# Patient Record
Sex: MALE | Race: WHITE | NOT HISPANIC OR LATINO | Employment: OTHER | URBAN - METROPOLITAN AREA
[De-identification: names, ages, dates, MRNs, and addresses within clinical notes are randomized per-mention and may not be internally consistent; named-entity substitution may affect disease eponyms.]

---

## 2017-03-27 ENCOUNTER — HOSPITAL ENCOUNTER (EMERGENCY)
Facility: CLINIC | Age: 57
Discharge: HOME | End: 2017-03-27
Attending: PHYSICIAN ASSISTANT | Admitting: PHYSICIAN ASSISTANT
Payer: COMMERCIAL

## 2017-03-27 ENCOUNTER — APPOINTMENT (OUTPATIENT)
Dept: GENERAL RADIOLOGY | Facility: CLINIC | Age: 57
End: 2017-03-27
Attending: PHYSICIAN ASSISTANT
Payer: COMMERCIAL

## 2017-03-27 VITALS
TEMPERATURE: 98.4 F | OXYGEN SATURATION: 98 % | SYSTOLIC BLOOD PRESSURE: 137 MMHG | RESPIRATION RATE: 14 BRPM | DIASTOLIC BLOOD PRESSURE: 91 MMHG

## 2017-03-27 DIAGNOSIS — S61.432A: ICD-10-CM

## 2017-03-27 DIAGNOSIS — S62.305B: ICD-10-CM

## 2017-03-27 DIAGNOSIS — L03.116 CELLULITIS OF LEFT LOWER EXTREMITY: ICD-10-CM

## 2017-03-27 PROCEDURE — 29125 APPL SHORT ARM SPLINT STATIC: CPT

## 2017-03-27 PROCEDURE — 29125 APPL SHORT ARM SPLINT STATIC: CPT | Performed by: PHYSICIAN ASSISTANT

## 2017-03-27 PROCEDURE — 99214 OFFICE O/P EST MOD 30 MIN: CPT | Mod: 25 | Performed by: PHYSICIAN ASSISTANT

## 2017-03-27 PROCEDURE — 25000125 ZZHC RX 250: Performed by: PHYSICIAN ASSISTANT

## 2017-03-27 PROCEDURE — 90471 IMMUNIZATION ADMIN: CPT

## 2017-03-27 PROCEDURE — 90715 TDAP VACCINE 7 YRS/> IM: CPT | Performed by: PHYSICIAN ASSISTANT

## 2017-03-27 PROCEDURE — 73130 X-RAY EXAM OF HAND: CPT | Mod: LT

## 2017-03-27 PROCEDURE — 99214 OFFICE O/P EST MOD 30 MIN: CPT | Mod: 25

## 2017-03-27 RX ORDER — CEPHALEXIN 500 MG/1
500 CAPSULE ORAL 4 TIMES DAILY
Qty: 40 CAPSULE | Refills: 0 | Status: SHIPPED | OUTPATIENT
Start: 2017-03-27 | End: 2017-04-06

## 2017-03-27 RX ADMIN — CLOSTRIDIUM TETANI TOXOID ANTIGEN (FORMALDEHYDE INACTIVATED), CORYNEBACTERIUM DIPHTHERIAE TOXOID ANTIGEN (FORMALDEHYDE INACTIVATED), BORDETELLA PERTUSSIS TOXOID ANTIGEN (GLUTARALDEHYDE INACTIVATED), BORDETELLA PERTUSSIS FILAMENTOUS HEMAGGLUTININ ANTIGEN (FORMALDEHYDE INACTIVATED), BORDETELLA PERTUSSIS PERTACTIN ANTIGEN, AND BORDETELLA PERTUSSIS FIMBRIAE 2/3 ANTIGEN 0.5 ML: 5; 2; 2.5; 5; 3; 5 INJECTION, SUSPENSION INTRAMUSCULAR at 14:20

## 2017-03-27 NOTE — ED PROVIDER NOTES
History     Chief Complaint   Patient presents with     Hand Injury     last night with a hammer     HPI  Randall J Jennissen is a 56 year old male who Randall J Jennissen is a 56 year old male who sustained a left hand injury 1 days ago.   Mechanism of injury: patient was hitting a metal tool and the hammer slipped and the tool punctured the left hand just proximal to the 4th knuckle.    Immediate symptoms: immediate pain, delayed swelling, was able to use arm directly after injury, no deformity was noted by the patient, positive swelling, redness, and increased pain to hand with decreased range of motion in hand due to swelling.    Symptoms have been gradual, worsening since that time.   Prior history of related problems: no prior problems with this area in the past.    last tetanus booster 5.5 years ago (booster given in office today)     Patient denies fevers, chills, red streaking up arm, numbness/tingling, wrist pain, or purulent discharge.     Problem list, Medication list, Allergies, and Medical/Social/Surgical histories reviewed in EPIC and updated as appropriate.    Review of Systems     ROS:    General: negative  Skin: positive for left hand redness, swelling, puncture wound.   Resp: No shortness of breath, dyspnea on exertion, cough, or hemoptysis  CV: negative  Musculoskeletal: negative  Neurologic: negative      Physical Exam   BP: (!) 137/91  Heart Rate: 63  Temp: 98.4  F (36.9  C)  Resp: 14  SpO2: 98 %  Physical Exam   Constitutional: healthy, alert and no distress   Cardiovascular: negative, PMI normal. No lifts, heaves, or thrills. RRR. No murmurs, clicks gallops or rub  Respiratory: Lungs clear to auscultation bilaterally. No rales, rhonchi, wheezing appreciated. No accessory muscle use or retractions.   SKIN: left hand erythema to dorsum of hand with closed puncture wound to the distal aspect of the 4th metacarpal. No warmth or drainage noted, or red streaking noted up arm.   JOINT/EXTREMITIES:  extremities normal- no gross deformities noted, gait normal, normal muscle tone, mild edema to the left hand, peripheral pulses normal, decreased range of motion in left and with making a fist due to swelling, and tender to palpation over 4th metatarsal of left hand. Wrist and fingers of left hand normal.     No results found for this or any previous visit (from the past 24 hour(s)).        ED Course     ED Course     Splint application  Date/Time: 3/27/2017 2:46 PM  Performed by: PRINCESS ANDERSEN  Authorized by: PRINCESS ANDERSEN   Consent: Verbal consent obtained.  Risks and benefits: risks, benefits and alternatives were discussed  Consent given by: patient  Patient understanding: patient states understanding of the procedure being performed  Patient identity confirmed: verbally with patient  Location details: left hand  Splint type: volar short arm  Supplies used: cotton padding,  elastic bandage and Ortho-Glass  Post-procedure: The splinted body part was neurovascularly unchanged following the procedure.  Patient tolerance: Patient tolerated the procedure well with no immediate complications                  Critical Care time:  none               Labs Ordered and Resulted from Time of ED Arrival Up to the Time of Departure from the ED - No data to display    Assessments & Plan (with Medical Decision Making)     I have reviewed the nursing notes.    I have reviewed the findings, diagnosis, plan and need for follow up with the patient.    New Prescriptions    CEPHALEXIN (KEFLEX) 500 MG CAPSULE    Take 1 capsule (500 mg) by mouth 4 times daily for 10 days       Final diagnoses:   Open nondisplaced fracture of fourth metacarpal bone of left hand, unspecified portion of metacarpal, initial encounter   Cellulitis of left lower extremity   Puncture wound, hand, left, initial encounter       3/27/2017   [facility] EMERGENCY DEPARTMENT     Princess Andersen, SUE  03/27/17 1449

## 2017-03-27 NOTE — ED AVS SNAPSHOT
[facility]    [address] [facility] [address] [address]    Phone:  [phone]    Fax:  [phone]                                       Randall J Jennissen   MRN: [id number]    Department:  [facility]   Date of Visit:  3/27/2017           After Visit Summary Signature Page     I have received my discharge instructions, and my questions have been answered. I have discussed any challenges I see with this plan with the nurse or doctor.    ..........................................................................................................................................  Patient/Patient Representative Signature      ..........................................................................................................................................  Patient Representative Print Name and Relationship to Patient    ..................................................               ................................................  Date                                            Time    ..........................................................................................................................................  Reviewed by Signature/Title    ...................................................              ..............................................  Date                                                            Time

## 2017-03-27 NOTE — ED AVS SNAPSHOT
[facility] Emergency Department    [address] [facility] [address] [address]    Phone:  [phone]    Fax:  [phone]                                       Randall J Jennissen   MRN: [id number]    Department:  [facility] Emergency Department   Date of Visit:  3/27/2017           Patient Information     Date Of Birth          1960        Your diagnoses for this visit were:     Open nondisplaced fracture of fourth metacarpal bone of left hand, unspecified portion of metacarpal, initial encounter     Cellulitis of left lower extremity     Puncture wound, hand, left, initial encounter        You were seen by Shell Martinez PA-C.      Follow-up Information     Follow up In 2 days.        Discharge Instructions       Rest and elevate the affected painful area as much as possible.    Apply ice for 15-20 minutes intermittently as needed and especially after any offending activity.   Use splint as directed.     Anti-inflammatories/pain relievers like tylenol/acetaminophen or ibuprofen can be very helpful if not allergic to or contraindicated.     Follow up with Orthopedic specialist for further evaluation and treatment     If you have additional questions about specific rehabilitation over time, Consider Physical Therapy if symptoms not better with symptomatic care.     Patient advised to follow up with PCP for recheck in 2 days and with Ortho.     Tetanus given in office today   Use medications as directed      Culture was not sent today.  Warm heat to area. Elevate area.    Tylenol/Ibuprofen OTC as discussed for pain as long as no contraindications or allergies.     Return to clinic or follow up with PCP for recheck in 2 days if no improvement.  To ER if any worsening symptoms at any time, you note the redness expanding outside the margins, red streaking, or fevers.     Patient voiced understanding of instructions given.           24 Hour Appointment Hotline       To make an appointment at any  [facility] call [phone] ([phone]). If you don't have a family doctor or clinic, we will help you find one. [facility] clinics are conveniently located to serve the needs of you and your family.          ED Discharge Orders     ORTHO  REFERRAL       [facility] is referring you to the Orthopedic  Services at [facility].       The  Representative will assist you in the coordination of your Orthopedic and Musculoskeletal Care as prescribed by your physician.    The  Representative will call you within 1 business day to help schedule your appointment, or you may contact the  Representative at:    All areas ~ [phone]     Type of Referral : Non Surgical       Timeframe requested: 1 - 2 days    Coverage of these services is subject to the terms and limitations of your health insurance plan.  Please call member services at your health plan with any benefit or coverage questions.      If X-rays, CT or MRI's have been performed, please contact the facility where they were done to arrange for , prior to your scheduled appointment.  Please bring this referral request to your appointment and present it to your specialist.                     Review of your medicines      START taking        Dose / Directions Last dose taken    cephALEXin 500 MG capsule   Commonly known as:  KEFLEX   Dose:  500 mg   Quantity:  40 capsule        Take 1 capsule (500 mg) by mouth 4 times daily for 10 days   Refills:  0          Our records show that you are taking the medicines listed below. If these are incorrect, please call your family doctor or clinic.        Dose / Directions Last dose taken    ibuprofen 200 MG tablet   Commonly known as:  ADVIL/MOTRIN   Quantity:  120        600 mg by mouth at bedtime   Refills:  0        zolpidem 5 MG tablet   Commonly known as:  AMBIEN   Quantity:  1 tablet        Take tablet by mouth 15 minutes  "prior to sleep, for Sleep Study   Refills:  0                Prescriptions were sent or printed at these locations (1 Prescription)                   [facility] [place], [place] - [address]   [address] [address]    Telephone:  [phone]   Fax:  [phone]   Hours:                  E-Prescribed (1 of 1)         cephALEXin (KEFLEX) 500 MG capsule                Procedures and tests performed during your visit     Hand XR, G/E 3 views, left    Splint application      Orders Needing Specimen Collection     None      Pending Results     No orders found from 3/25/2017 to 3/28/2017.            Pending Culture Results     No orders found from 3/25/2017 to 3/28/2017.             Test Results from your hospital stay     3/27/2017  2:18 PM - Interface, Radiant Ib      Narrative     XR HAND LT G/E 3 VW 3/27/2017 2:13 PM    COMPARISON: None.    HISTORY: Injury with puncture wound over fourth knuckle.        Impression     IMPRESSION: Cortical irregularity at the ulnar aspect of the left  fourth metacarpal head, likely representing fracture. No other  fractures are suspected in the left hand. Joints are preserved and in  normal alignment.    LASHELL MCGINNIS                Thank you for choosing [facility]       Thank you for choosing [facility] for your care. Our goal is always to provide you with excellent care. Hearing back from our patients is one way we can continue to improve our services. Please take a few minutes to complete the written survey that you may receive in the mail after you visit with us. Thank you!        [employer] Information     [employer] lets you send messages to your doctor, view your test results, renew your prescriptions, schedule appointments and more. To sign up, go to [email or web address] . Click on \"Log in\" on the left side of the screen, which will take you to the Welcome page. Then click on \"Sign up Now\" on the right side of the page.     You will be asked to " enter the access code listed below, as well as some personal information. Please follow the directions to create your username and password.     Your access code is: [email or web address]  Expires: 2017  2:50 PM     Your access code will  in 90 days. If you need help or a new code, please call your [facility] clinic or [phone].        Care EveryWhere ID     This is your Care EveryWhere ID. This could be used by other organizations to access your [facility] medical records  [id number]        After Visit Summary       This is your record. Keep this with you and show to your community pharmacist(s) and doctor(s) at your next visit.

## 2017-03-27 NOTE — DISCHARGE INSTRUCTIONS
Rest and elevate the affected painful area as much as possible.    Apply ice for 15-20 minutes intermittently as needed and especially after any offending activity.   Use splint as directed.     Anti-inflammatories/pain relievers like tylenol/acetaminophen or ibuprofen can be very helpful if not allergic to or contraindicated.     Follow up with Orthopedic specialist for further evaluation and treatment     If you have additional questions about specific rehabilitation over time, Consider Physical Therapy if symptoms not better with symptomatic care.     Patient advised to follow up with PCP for recheck in 2 days and with Ortho.     Tetanus given in office today   Use medications as directed      Culture was not sent today.  Warm heat to area. Elevate area.    Tylenol/Ibuprofen OTC as discussed for pain as long as no contraindications or allergies.     Return to clinic or follow up with PCP for recheck in 2 days if no improvement.  To ER if any worsening symptoms at any time, you note the redness expanding outside the margins, red streaking, or fevers.     Patient voiced understanding of instructions given.

## 2019-03-27 ENCOUNTER — OFFICE VISIT (OUTPATIENT)
Dept: FAMILY MEDICINE | Facility: CLINIC | Age: 59
End: 2019-03-27
Payer: COMMERCIAL

## 2019-03-27 VITALS
TEMPERATURE: 97.3 F | DIASTOLIC BLOOD PRESSURE: 78 MMHG | SYSTOLIC BLOOD PRESSURE: 120 MMHG | RESPIRATION RATE: 16 BRPM | WEIGHT: 210 LBS | BODY MASS INDEX: 30.13 KG/M2 | HEART RATE: 72 BPM

## 2019-03-27 DIAGNOSIS — Z11.59 NEED FOR HEPATITIS C SCREENING TEST: ICD-10-CM

## 2019-03-27 DIAGNOSIS — G47.33 OSA (OBSTRUCTIVE SLEEP APNEA): ICD-10-CM

## 2019-03-27 DIAGNOSIS — M19.049 HAND ARTHRITIS: ICD-10-CM

## 2019-03-27 DIAGNOSIS — G89.29 CHRONIC PAIN OF BOTH SHOULDERS: ICD-10-CM

## 2019-03-27 DIAGNOSIS — M25.511 CHRONIC PAIN OF BOTH SHOULDERS: ICD-10-CM

## 2019-03-27 DIAGNOSIS — M25.512 CHRONIC PAIN OF BOTH SHOULDERS: ICD-10-CM

## 2019-03-27 DIAGNOSIS — E78.5 HYPERLIPIDEMIA LDL GOAL <160: Chronic | ICD-10-CM

## 2019-03-27 DIAGNOSIS — R53.83 FATIGUE, UNSPECIFIED TYPE: ICD-10-CM

## 2019-03-27 DIAGNOSIS — Z00.00 PREVENTATIVE HEALTH CARE: Primary | ICD-10-CM

## 2019-03-27 LAB
ANION GAP SERPL CALCULATED.3IONS-SCNC: 6 MMOL/L (ref 3–14)
BUN SERPL-MCNC: 16 MG/DL (ref 7–30)
CALCIUM SERPL-MCNC: 8.8 MG/DL (ref 8.5–10.1)
CHLORIDE SERPL-SCNC: 106 MMOL/L (ref 94–109)
CO2 SERPL-SCNC: 27 MMOL/L (ref 20–32)
CREAT SERPL-MCNC: 1.09 MG/DL (ref 0.66–1.25)
ERYTHROCYTE [DISTWIDTH] IN BLOOD BY AUTOMATED COUNT: 13.1 % (ref 10–15)
GFR SERPL CREATININE-BSD FRML MDRD: 73 ML/MIN/{1.73_M2}
GLUCOSE SERPL-MCNC: 90 MG/DL (ref 70–99)
HCT VFR BLD AUTO: 42.1 % (ref 40–53)
HGB BLD-MCNC: 14.4 G/DL (ref 13.3–17.7)
MCH RBC QN AUTO: 31.5 PG (ref 26.5–33)
MCHC RBC AUTO-ENTMCNC: 34.2 G/DL (ref 31.5–36.5)
MCV RBC AUTO: 92 FL (ref 78–100)
PLATELET # BLD AUTO: 290 10E9/L (ref 150–450)
POTASSIUM SERPL-SCNC: 4.2 MMOL/L (ref 3.4–5.3)
RBC # BLD AUTO: 4.57 10E12/L (ref 4.4–5.9)
SODIUM SERPL-SCNC: 139 MMOL/L (ref 133–144)
TSH SERPL DL<=0.005 MIU/L-ACNC: 2.73 MU/L (ref 0.4–4)
WBC # BLD AUTO: 5.6 10E9/L (ref 4–11)

## 2019-03-27 PROCEDURE — 36415 COLL VENOUS BLD VENIPUNCTURE: CPT | Performed by: FAMILY MEDICINE

## 2019-03-27 PROCEDURE — 84443 ASSAY THYROID STIM HORMONE: CPT | Performed by: FAMILY MEDICINE

## 2019-03-27 PROCEDURE — 85027 COMPLETE CBC AUTOMATED: CPT | Performed by: FAMILY MEDICINE

## 2019-03-27 PROCEDURE — 80048 BASIC METABOLIC PNL TOTAL CA: CPT | Performed by: FAMILY MEDICINE

## 2019-03-27 PROCEDURE — 99396 PREV VISIT EST AGE 40-64: CPT | Performed by: FAMILY MEDICINE

## 2019-03-27 NOTE — PATIENT INSTRUCTIONS
Preventive Health Recommendations  Male Ages 50 - 64    Yearly exam:             See your health care provider every year in order to  o   Review health changes.   o   Discuss preventive care.    o   Review your medicines if your doctor has prescribed any.     Have a cholesterol test every 5 years, or more frequently if you are at risk for high cholesterol/heart disease.     Have a diabetes test (fasting glucose) every three years. If you are at risk for diabetes, you should have this test more often.     Have a colonoscopy at age 50, or have a yearly FIT test (stool test). These exams will check for colon cancer.      Talk with your health care provider about whether or not a prostate cancer screening test (PSA) is right for you.    You should be tested each year for STDs (sexually transmitted diseases), if you re at risk.     Shots: Get a flu shot each year. Get a tetanus shot every 10 years.     Nutrition:    Eat at least 5 servings of fruits and vegetables daily.     Eat whole-grain bread, whole-wheat pasta and brown rice instead of white grains and rice.     Get adequate Calcium and Vitamin D.     Lifestyle    Exercise for at least 150 minutes a week (30 minutes a day, 5 days a week). This will help you control your weight and prevent disease.     Limit alcohol to one drink per day.     No smoking.     Wear sunscreen to prevent skin cancer.     See your dentist every six months for an exam and cleaning.     See your eye doctor every 1 to 2 years.    ASSESSMENT/PLAN:                                                    Lifestyle recommendations:regular exercise, at least 150 minutes per week (average 30 minutes 5 times a week)  keep working on losing weight (ideal BMI-body mass index is <25)  always wear seat belt  The following exams/tests were recommended and discussed for health maintenance:  Colon cancer screening recommended 2021  Prostate cancer screening is optional starting at age 50 if normal risk, age  40 or 45 for high risk men (strong family history or blacks.)  Screening can include digital rectal exam and PSA blood test.     (Z00.00) Preventative health care  (primary encounter diagnosis)    (M25.511,  G89.29,  M25.512) Chronic pain of both shoulders  Comment:   Plan: Discussed options for treatment of shoulder pain which have some proven effectiveness including, appropriate doses of an anti-inflammatory medication like ibuprofen or naproxen.  Typicaly these are taken on a regular basis for a couple weeks.   Physical therapy is another way to improve shoulder pain and function.    Recheck if he is having persistent pain. Corticosteroid injection may be an option as well if not improving.     (M19.049) Hand arthritis  Comment: most likely osteoarthritis.    Plan: OK for the ibuprofen as needed.    Pain and anti-inflammatory medication options:  Ibuprofen 200mg OTC may be taken up to 4 pills 4 times a day to the maximum of 3200mg or 16 pills daily as needed for pain.     Naproxen (Aleve) OTC may be taken up to 2 pills 2 times a day to the maximum of 4 pills daily as needed for pain.     Aspirin may be taken up to 2-3 pills every 4 hours as needed for pain.     Take only one type of these at a time and take with food as they all can irritate stomach and cause ulcers.      Other pain medication:  Acetaminophen (Tylenol) may be taken as needed for pain and fever.  The maximum doses are listed below, around 3000mg a day.  Regular strength pills are 325mg.  The maximum daily dose is two pills (650mg) every 4 to 6 hours up to 3250mg a day.   Extra strength pills are 500mg each.  The maximum dose is two pills (1000mg) every 6 hours as needed up to 3000mg a day.   Extended release pills are 650mg each.  The maximum dose is two pills (1300mg) every 8 hours as needed up to 3900mg a day.     Watch out for acetaminophen in other over the counter or prescription medications.      Too much acetaminophen can lead to serious  liver damage.  It is OK to take acetaminophen with above anti-inflammatory medications.      (R53.83) Fatigue, unspecified type  Comment: probable from sleep apnea  Plan: TSH, Basic metabolic panel, CBC with platelets        CPAP would be the most beneficial.   Let me know if you would like a sleep clinic referral     (E78.5) Hyperlipidemia LDL goal <160  Comment:   Plan: Non-fasting blood tests today.     (G47.33) JO (obstructive sleep apnea)  Comment:   Plan: see above

## 2019-03-27 NOTE — NURSING NOTE
"Chief Complaint   Patient presents with     Physical       Initial /78 (BP Location: Right arm, Patient Position: Chair, Cuff Size: Adult Regular)   Pulse 72   Temp 97.3  F (36.3  C) (Tympanic)   Resp 16   Wt 95.3 kg (210 lb)   BMI 30.13 kg/m   Estimated body mass index is 30.13 kg/m  as calculated from the following:    Height as of 12/8/16: 1.778 m (5' 10\").    Weight as of this encounter: 95.3 kg (210 lb).    Patient presents to the clinic using No DME    Health Maintenance that is potentially due pending provider review:  NONE    Angeli Jensen MA  4:42 PM 3/27/2019  .        "

## 2019-03-27 NOTE — PROGRESS NOTES
"  SUBJECTIVE:   CC: Randall J Jennissen is an 58 year old male who presents for preventive health visit.   Chief Complaint   Patient presents with     Physical      Self employed.  Has high deductible insurance.     Issues:  Fatigue.  Sleep study a couple years ago.  Diagnosed with sleep apnea.  Tried mouthgard by his dentist which did not help.  Was recommended CPAP but did not try it.  Snores.  No known apnea.   He has daytime sleepiness.     bilateral shoulder pain.  Course of symptoms over time: worse. He has had this checked out in the past.  Unable to do pushups any more.  He had MRI.      Some arthritis in hands.  Came on fast.  No erythema or swelling.  Mostly left hand some right 2,3,4 MCP and PIP.  Seemed sudden onset.  Some AM stiffness but can last all day.     Takes ibuprofen at night for back.  Takes 400-600mg.    \"Genomic spine disorder\"    Healthy Habits:    Do you get at least three servings of calcium containing foods daily (dairy, green leafy vegetables, etc.)? yes and no, taking calcium and/or vitamin D supplement: no    Amount of exercise or daily activities, outside of work: \"very little\" day(s) per week    Problems taking medications regularly not applicable    Medication side effects: No    Have you had an eye exam in the past two years? Yes, within last month    Do you see a dentist twice per year? yes    Do you have sleep apnea, excessive snoring or daytime drowsiness? Unknown, has had a test but results unknown. X 2-3 years ago    Today's PHQ-2 Score:   PHQ-2 ( 1999 [employer]) 3/27/2019 2/2/2016   Q1: Little interest or pleasure in doing things 0 0   Q2: Feeling down, depressed or hopeless 0 0   PHQ-2 Score 0 0   PHQ-2 Score Incomplete -     Abuse: Current or Past(Physical, Sexual or Emotional)- No  Do you feel safe in your environment? Yes    Social History     Tobacco Use     Smoking status: Former Smoker     Last attempt to quit: 1/1/2004     Years since quitting: 15.2     Smokeless " tobacco: Never Used   Substance Use Topics     Alcohol use: Yes     Alcohol/week: 0.0 oz     Comment: 12 pack in a week     If you drink alcohol do you typically have >3 drinks per day or >7 drinks per week? Yes - AUDIT SCORE:     No flowsheet data found.   Alcohol 1-2 drinks a day.  More on weekends.   UP to 10-12 drinks on a weekend.                       Last PSA: No results found for: PSA    Patient Active Problem List    Diagnosis Date Noted     JO (obstructive sleep apnea) 2016     Priority: Medium     Moderate JO without sleep-associated hypoxemia  2016 - AHI 18.4, maikol SpO2 86%, time of SpO2 <= 88% of 0.3 minutes.  TST of ~180 minutes, sleep efficiency ~45%, but supine REM was observed without significant worsening of JO.  Two periods of frequent PLM's observed, but only infrequently associated with cortical arousals.  EKG NSR.       Non morbid obesity due to excess calories 2016     Priority: Medium     Allergic conjunctivitis 2014     Priority: Medium     Hyperlipidemia LDL goal <160 10/31/2010     Priority: Medium        Family history:  CV disease: no  Prostate cancer: no  Colon cancer: no    Multivitamin or Vit D use: no    Vaccines:current tetanus.      Past Colon cancer screenin    ROS:  General: No change in weight, sleep or appetite.  Normal energy.  No fever or chills  Eyes: Negative for vision changes or eye problems  ENT: No problems with ears, nose or throat.  No difficulty swallowing.  Resp: No coughing, wheezing or shortness of breath.  Feels he is more out of shape.   CV: No chest pains or palpitations  GI: No nausea, vomiting,  heartburn, abdominal pain, diarrhea, constipation or change in bowel habits  : No urinary frequency or dysuria, bladder or kidney problems  Musculoskeletal: arthralgias hands and shoulders.  Neurologic: No headaches, numbness, tingling, weakness, problems with balance or coordination  Psychiatric: No problems with anxiety,  depression or mental health  Heme/immune/allergy: No history of bleeding or clotting problems or anemia.  No allergies or immune system problems  Endocrine: No history of thyroid disease, diabetes or other endocrine disorders  Skin: No rashes,worrisome lesions or skin problems    OBJECTIVE:                                                    OBJECTIVE:Blood pressure 120/78, pulse 72, temperature 97.3  F (36.3  C), temperature source Tympanic, resp. rate 16, weight 95.3 kg (210 lb). BMI=Body mass index is 30.13 kg/m .  GENERAL APPEARANCE ADULT: Alert, no acute distress  EYES: PERRL, EOM normal, conjunctiva and lids normal  HENT: Ears and TMs normal, oral mucosa and posterior oropharynx normal  NECK: No adenopathy,masses or thyromegaly  RESP: lungs clear to auscultation   CV: normal rate, regular rhythm, no murmur or gallop  ABDOMEN: soft, no organomegaly, masses or tenderness   (male): declined  MS: bilateral shoulder exam: appearance normal, range of motion: full but slow and painful with abduction over 90 degrees, impingement signs present, strength in abduction decreased.   hand exam Normal hand appearance and range of motion, non-tender, swelling-none, erythema-none  No peripheral edema.     ASSESSMENT/PLAN:                                                    Lifestyle recommendations:regular exercise, at least 150 minutes per week (average 30 minutes 5 times a week)  keep working on losing weight (ideal BMI-body mass index is <25)  always wear seat belt  The following exams/tests were recommended and discussed for health maintenance:  Colon cancer screening recommended 2021  Prostate cancer screening is optional starting at age 50 if normal risk, age 40 or 45 for high risk men (strong family history or blacks.)  Screening can include digital rectal exam and PSA blood test.     (Z00.00) Preventative health care  (primary encounter diagnosis)    (M25.511,  G89.29,  M25.512) Chronic pain of both shoulders  Comment:    Plan: Discussed options for treatment of shoulder pain which have some proven effectiveness including, appropriate doses of an anti-inflammatory medication like ibuprofen or naproxen.  Typicaly these are taken on a regular basis for a couple weeks.   Physical therapy is another way to improve shoulder pain and function.    Recheck if he is having persistent pain. Corticosteroid injection may be an option as well if not improving.     (M19.049) Hand arthritis  Comment: most likely osteoarthritis.    Plan: OK for the ibuprofen as needed.    Pain and anti-inflammatory medication options:  Ibuprofen 200mg OTC may be taken up to 4 pills 4 times a day to the maximum of 3200mg or 16 pills daily as needed for pain.     Naproxen (Aleve) OTC may be taken up to 2 pills 2 times a day to the maximum of 4 pills daily as needed for pain.     Aspirin may be taken up to 2-3 pills every 4 hours as needed for pain.     Take only one type of these at a time and take with food as they all can irritate stomach and cause ulcers.      Other pain medication:  Acetaminophen (Tylenol) may be taken as needed for pain and fever.  The maximum doses are listed below, around 3000mg a day.  Regular strength pills are 325mg.  The maximum daily dose is two pills (650mg) every 4 to 6 hours up to 3250mg a day.   Extra strength pills are 500mg each.  The maximum dose is two pills (1000mg) every 6 hours as needed up to 3000mg a day.   Extended release pills are 650mg each.  The maximum dose is two pills (1300mg) every 8 hours as needed up to 3900mg a day.     Watch out for acetaminophen in other over the counter or prescription medications.      Too much acetaminophen can lead to serious liver damage.  It is OK to take acetaminophen with above anti-inflammatory medications.      (R53.83) Fatigue, unspecified type  Comment: probable from sleep apnea  Plan: TSH, Basic metabolic panel, CBC with platelets        CPAP would be the most beneficial.   Let me  "know if you would like a sleep clinic referral     (E78.5) Hyperlipidemia LDL goal <160  Comment:   Plan: Non-fasting blood tests today.     (G47.33) JO (obstructive sleep apnea)  Comment:   Plan: see above      COUNSELING:  Reviewed preventive health counseling, as reflected in patient instructions  Special attention given to:        Consider Hep C screening for patients born between 1945 and 1965       HIV screeninx in teen years, 1x in adult years, and at intervals if high risk       Colon cancer screening       Prostate cancer screening    BP Readings from Last 1 Encounters:   17 (!) 137/91     Estimated body mass index is 30.13 kg/m  as calculated from the following:    Height as of 16: 1.778 m (5' 10\").    Weight as of 16: 95.3 kg (210 lb).      Weight management plan: Discussed healthy diet and exercise guidelines     reports that he quit smoking about 15 years ago. he has never used smokeless tobacco.      Counseling Resources:  ATP IV Guidelines  Pooled Cohorts Equation Calculator  FRAX Risk Assessment  ICSI Preventive Guidelines  Dietary Guidelines for Americans, 2010  USDA's MyPlate  ASA Prophylaxis  Lung CA Screening    Uriel Sams MD  [facility]  "

## 2019-03-28 NOTE — RESULT ENCOUNTER NOTE
Please call.  TSH is normal indicating that the level of thyroid hormone is in the normal range.   The blood chemistries (Basic metabolic panel) are all normal including electrolytes (salt balances in the blood), blood glucose and kidney tests.   Your blood counts including the white blood cells, red blood cells and platelets are all normal.

## 2019-08-28 ENCOUNTER — NURSE TRIAGE (OUTPATIENT)
Dept: NURSING | Facility: CLINIC | Age: 59
End: 2019-08-28

## 2019-08-28 ENCOUNTER — OFFICE VISIT (OUTPATIENT)
Dept: PODIATRY | Facility: CLINIC | Age: 59
End: 2019-08-28
Payer: COMMERCIAL

## 2019-08-28 VITALS
HEIGHT: 70 IN | SYSTOLIC BLOOD PRESSURE: 148 MMHG | WEIGHT: 208.8 LBS | DIASTOLIC BLOOD PRESSURE: 95 MMHG | HEART RATE: 56 BPM | BODY MASS INDEX: 29.89 KG/M2

## 2019-08-28 DIAGNOSIS — M76.62 TENDONITIS, ACHILLES, LEFT: Primary | ICD-10-CM

## 2019-08-28 PROCEDURE — 99203 OFFICE O/P NEW LOW 30 MIN: CPT | Performed by: PODIATRIST

## 2019-08-28 RX ORDER — METHYLPREDNISOLONE 4 MG
4 TABLET, DOSE PACK ORAL SEE ADMIN INSTRUCTIONS
Qty: 21 TABLET | Refills: 0 | Status: SHIPPED | OUTPATIENT
Start: 2019-08-28 | End: 2020-07-09

## 2019-08-28 RX ORDER — DIPHENOXYLATE HYDROCHLORIDE AND ATROPINE SULFATE 2.5; .025 MG/1; MG/1
1 TABLET ORAL
COMMUNITY
Start: 2017-09-21 | End: 2023-10-03

## 2019-08-28 RX ORDER — PHYTONADIONE 5 MG/1
5 TABLET ORAL ONCE
COMMUNITY
End: 2023-10-03

## 2019-08-28 RX ORDER — MELOXICAM 7.5 MG/1
7.5 TABLET ORAL DAILY
Qty: 30 TABLET | Refills: 1 | Status: SHIPPED | OUTPATIENT
Start: 2019-08-28 | End: 2020-07-09

## 2019-08-28 RX ORDER — CHOLECALCIFEROL (VITAMIN D3) 50 MCG
1 TABLET ORAL DAILY
COMMUNITY
End: 2023-10-03

## 2019-08-28 ASSESSMENT — MIFFLIN-ST. JEOR: SCORE: 1755.42

## 2019-08-28 ASSESSMENT — PAIN SCALES - GENERAL: PAINLEVEL: MILD PAIN (3)

## 2019-08-28 NOTE — LETTER
8/28/2019         RE: Randall J Jennissen  [address] [facility] [id number]        Dear Colleague,    Thank you for referring your patient, Randall J Jennissen, to the [facility]. Please see a copy of my visit note below.    PATIENT HISTORY:  Randall J Jennissen is a 58 year old male who presents to clinic for a painful left foot .  The patient describes the pain as sharp stabbing.  The patient relates the pain level is moderate.  The patient relates pain is located in the back of the left ankle.  The patient relates the pain has been present for the past several weeks.  The patient relates pain with ambulation.  The patient has tried different shoes with little relief.         REVIEW OF SYSTEMS:  Constitutional, HEENT, cardiovascular, pulmonary, GI, , musculoskeletal, neuro, skin, endocrine and psych systems are negative, except as otherwise noted.     PAST MEDICAL HISTORY:   Past Medical History:   Diagnosis Date     Calculus of kidney      Headache(784.0)     likely stress     Keratoconus      Uses contact lenses         PAST SURGICAL HISTORY:   Past Surgical History:   Procedure Laterality Date     COLONOSCOPY       COLONOSCOPY N/A 12/8/2016    Procedure: COLONOSCOPY;  Surgeon: Sean Lopez MD;  Location: [facility]     SURGICAL HISTORY OF -       ORIF left leg     SURGICAL HISTORY OF -       low back nerve block?        MEDICATIONS:   Current Outpatient Medications:      Cyanocobalamin (VITAMIN B 12) 100 MCG REYES, , Disp: , Rfl:      IBUPROFEN 200 MG OR TABS, 600 mg by mouth at bedtime, Disp: 120, Rfl: 0     Multiple Vitamin (MULTI-VITAMINS) TABS, Take 1 tablet by mouth, Disp: , Rfl:      phytonadione (MEPHYTON/VIT K) 5 MG tablet, Take 5 mg by mouth once, Disp: , Rfl:      vitamin D3 (CHOLECALCIFEROL) 2000 units (50 mcg) tablet, Take 1 tablet by mouth daily, Disp: , Rfl:      ALLERGIES:  No Known Allergies     SOCIAL HISTORY:   Social History     Socioeconomic History     Marital  "status:      Spouse name: Not on file     Number of children: Not on file     Years of education: Not on file     Highest education level: Not on file   Occupational History     Not on file   Social Needs     Financial resource strain: Not on file     Food insecurity:     Worry: Not on file     Inability: Not on file     Transportation needs:     Medical: Not on file     Non-medical: Not on file   Tobacco Use     Smoking status: Former Smoker     Last attempt to quit: 1/1/2004     Years since quitting: 15.6     Smokeless tobacco: Never Used   Substance and Sexual Activity     Alcohol use: Yes     Alcohol/week: 0.0 oz     Comment: 12 pack in a week     Drug use: No     Sexual activity: Not on file   Lifestyle     Physical activity:     Days per week: Not on file     Minutes per session: Not on file     Stress: Not on file   Relationships     Social connections:     Talks on phone: Not on file     Gets together: Not on file     Attends Anglican service: Not on file     Active member of club or organization: Not on file     Attends meetings of clubs or organizations: Not on file     Relationship status: Not on file     Intimate partner violence:     Fear of current or ex partner: Not on file     Emotionally abused: Not on file     Physically abused: Not on file     Forced sexual activity: Not on file   Other Topics Concern     Parent/sibling w/ CABG, MI or angioplasty before 65F 55M? No   Social History Narrative     Not on file        FAMILY HISTORY:   Family History   Problem Relation Age of Onset     Cancer - colorectal No family hx of      Prostate Cancer No family hx of      C.A.D. No family hx of         EXAM:Vitals: BP (!) 148/95   Pulse 56   Ht 1.765 m (5' 9.5\")   Wt 94.7 kg (208 lb 12.8 oz)   BMI 30.39 kg/m     BMI= Body mass index is 30.39 kg/m .    Weight management plan: Patient was referred to their PCP to discuss a diet and exercise plan.    General appearance: Patient is alert and fully " cooperative with history & exam.  No sign of distress is noted during the visit.     Psychiatric: Affect is pleasant & appropriate.  Patient appears motivated to improve health.     Respiratory: Breathing is regular & unlabored while sitting.     HEENT: Hearing is intact to spoken word.  Speech is clear.  No gross evidence of visual impairment that would impact ambulation.     Dermatologic: Skin is intact to left lower extremities without significant lesions, rash or abrasion.  No paronychia or evidence of soft tissue infection is noted.     Vascular: DP & PT pulses are intact & regular on the left.  No significant edema or varicosities noted.  CFT and skin temperature is normal to the left lower extremities.     Neurologic: Lower extremity sensation is intact to light touch.  No evidence of weakness or contracture in the left lower extremities.  No evidence of neuropathy.     Musculoskeletal: Patient is ambulatory without assistive device or brace.  No gross ankle deformity noted.  No foot or ankle joint effusion is noted.  Of the pain on palpation of the Achilles tendon on the left.  No surrounding erythema noted.  On notes a tight gastroc complex in the left lower extremity.       ASSESSMENT / PLAN:     ICD-10-CM    1. Tendonitis, Achilles, left M76.62        I have explained to Carmelo  about the conditions.  We discussed the nature of the condition as well as the treatment plan and expected length of recovery.  At this time, the patient was instructed on icing, stretching, tissue massage and support.  The patient was fitted with 1/4 inch heel lift and a Tri-Lock ankle brace that will aid in offloading the tension forces to the soft tissues and prevent further inflammation.  To reduce the amount of current inflammation, the patient was prescribed a Medrol Dosepak followed with Mobic 7.5 mg to be taken daily with food and instructed to stop taking if any stomach irritation or swelling in extremities are noted.   The patient will return in four weeks for reevaluation if the symptoms do not resolve.        Disclaimer: This note consists of symbols derived from keyboarding, dictation and/or voice recognition software. As a result, there may be errors in the script that have gone undetected. Please consider this when interpreting information found in this chart.       MARYAM Espinal D.P.M., F.[email or web address].[email or web address].S.        Again, thank you for allowing me to participate in the care of your patient.        Sincerely,        Jp Espinal DPM

## 2019-08-28 NOTE — PROGRESS NOTES
PATIENT HISTORY:  Randall J Jennissen is a 58 year old male who presents to clinic for a painful left foot .  The patient describes the pain as sharp stabbing.  The patient relates the pain level is moderate.  The patient relates pain is located in the back of the left ankle.  The patient relates the pain has been present for the past several weeks.  The patient relates pain with ambulation.  The patient has tried different shoes with little relief.         REVIEW OF SYSTEMS:  Constitutional, HEENT, cardiovascular, pulmonary, GI, , musculoskeletal, neuro, skin, endocrine and psych systems are negative, except as otherwise noted.     PAST MEDICAL HISTORY:   Past Medical History:   Diagnosis Date     Calculus of kidney      Headache(784.0)     likely stress     Keratoconus      Uses contact lenses         PAST SURGICAL HISTORY:   Past Surgical History:   Procedure Laterality Date     COLONOSCOPY       COLONOSCOPY N/A 12/8/2016    Procedure: COLONOSCOPY;  Surgeon: Sean Lopez MD;  Location: [facility]     SURGICAL HISTORY OF -       ORIF left leg     SURGICAL HISTORY OF -       low back nerve block?        MEDICATIONS:   Current Outpatient Medications:      Cyanocobalamin (VITAMIN B 12) 100 MCG REYES, , Disp: , Rfl:      IBUPROFEN 200 MG OR TABS, 600 mg by mouth at bedtime, Disp: 120, Rfl: 0     Multiple Vitamin (MULTI-VITAMINS) TABS, Take 1 tablet by mouth, Disp: , Rfl:      phytonadione (MEPHYTON/VIT K) 5 MG tablet, Take 5 mg by mouth once, Disp: , Rfl:      vitamin D3 (CHOLECALCIFEROL) 2000 units (50 mcg) tablet, Take 1 tablet by mouth daily, Disp: , Rfl:      ALLERGIES:  No Known Allergies     SOCIAL HISTORY:   Social History     Socioeconomic History     Marital status:      Spouse name: Not on file     Number of children: Not on file     Years of education: Not on file     Highest education level: Not on file   Occupational History     Not on file   Social Needs     Financial resource strain: Not on file  "    Food insecurity:     Worry: Not on file     Inability: Not on file     Transportation needs:     Medical: Not on file     Non-medical: Not on file   Tobacco Use     Smoking status: Former Smoker     Last attempt to quit: 1/1/2004     Years since quitting: 15.6     Smokeless tobacco: Never Used   Substance and Sexual Activity     Alcohol use: Yes     Alcohol/week: 0.0 oz     Comment: 12 pack in a week     Drug use: No     Sexual activity: Not on file   Lifestyle     Physical activity:     Days per week: Not on file     Minutes per session: Not on file     Stress: Not on file   Relationships     Social connections:     Talks on phone: Not on file     Gets together: Not on file     Attends Judaism service: Not on file     Active member of club or organization: Not on file     Attends meetings of clubs or organizations: Not on file     Relationship status: Not on file     Intimate partner violence:     Fear of current or ex partner: Not on file     Emotionally abused: Not on file     Physically abused: Not on file     Forced sexual activity: Not on file   Other Topics Concern     Parent/sibling w/ CABG, MI or angioplasty before 65F 55M? No   Social History Narrative     Not on file        FAMILY HISTORY:   Family History   Problem Relation Age of Onset     Cancer - colorectal No family hx of      Prostate Cancer No family hx of      C.A.D. No family hx of         EXAM:Vitals: BP (!) 148/95   Pulse 56   Ht 1.765 m (5' 9.5\")   Wt 94.7 kg (208 lb 12.8 oz)   BMI 30.39 kg/m    BMI= Body mass index is 30.39 kg/m .    Weight management plan: Patient was referred to their PCP to discuss a diet and exercise plan.    General appearance: Patient is alert and fully cooperative with history & exam.  No sign of distress is noted during the visit.     Psychiatric: Affect is pleasant & appropriate.  Patient appears motivated to improve health.     Respiratory: Breathing is regular & unlabored while sitting.     HEENT: Hearing " is intact to spoken word.  Speech is clear.  No gross evidence of visual impairment that would impact ambulation.     Dermatologic: Skin is intact to left lower extremities without significant lesions, rash or abrasion.  No paronychia or evidence of soft tissue infection is noted.     Vascular: DP & PT pulses are intact & regular on the left.  No significant edema or varicosities noted.  CFT and skin temperature is normal to the left lower extremities.     Neurologic: Lower extremity sensation is intact to light touch.  No evidence of weakness or contracture in the left lower extremities.  No evidence of neuropathy.     Musculoskeletal: Patient is ambulatory without assistive device or brace.  No gross ankle deformity noted.  No foot or ankle joint effusion is noted.  Of the pain on palpation of the Achilles tendon on the left.  No surrounding erythema noted.  On notes a tight gastroc complex in the left lower extremity.       ASSESSMENT / PLAN:     ICD-10-CM    1. Tendonitis, Achilles, left M76.62        I have explained to Carmelo  about the conditions.  We discussed the nature of the condition as well as the treatment plan and expected length of recovery.  At this time, the patient was instructed on icing, stretching, tissue massage and support.  The patient was fitted with 1/4 inch heel lift and a Tri-Lock ankle brace that will aid in offloading the tension forces to the soft tissues and prevent further inflammation.  To reduce the amount of current inflammation, the patient was prescribed a Medrol Dosepak followed with Mobic 7.5 mg to be taken daily with food and instructed to stop taking if any stomach irritation or swelling in extremities are noted.  The patient will return in four weeks for reevaluation if the symptoms do not resolve.        Disclaimer: This note consists of symbols derived from keyboarding, dictation and/or voice recognition software. As a result, there may be errors in the script that have  gone undetected. Please consider this when interpreting information found in this chart.       MARYAM Espinal D.P.M., PAULO.F.KELVINS.

## 2019-08-28 NOTE — TELEPHONE ENCOUNTER
Patient wanted an appointment. I suggested podiatry or internal medicine. His achilles tendon has been bothering his left foot for two months now.  Belkys Kemp RN-[facility]

## 2019-08-28 NOTE — NURSING NOTE
"Chief Complaint   Patient presents with     Consult     Achilies tendon left foot, X2 months       Initial BP (!) 148/95   Pulse 56   Ht 1.765 m (5' 9.5\")   Wt 94.7 kg (208 lb 12.8 oz)   BMI 30.39 kg/m   Estimated body mass index is 30.39 kg/m  as calculated from the following:    Height as of this encounter: 1.765 m (5' 9.5\").    Weight as of this encounter: 94.7 kg (208 lb 12.8 oz).  Medications and allergies reviewed.      Terrie MORALES MA    "

## 2019-08-28 NOTE — PATIENT INSTRUCTIONS
Carmelo to follow up with Primary Care provider regarding elevated blood pressure.    Initial musculoskeletal treatment recommendation:    1.  Wear supportive foot wear (stiff soles) and/or arch supports (rigid not cushion).  2.  Stretch the calf muscles as instructed once an hour.  3.  Massage the soft tissues around the injured area in the morning to loosen the tissue with an over the counter product like Icy Hot with Lidocaine  4.  Ice the injured area in the evening; 20 min on/off.  5. Take antiinflammatory medication as indicated.    If no improvement in symptoms within four to six weeks, return to clinic for reevaluation.

## 2019-11-18 ENCOUNTER — OFFICE VISIT (OUTPATIENT)
Dept: PODIATRY | Facility: CLINIC | Age: 59
End: 2019-11-18
Payer: COMMERCIAL

## 2019-11-18 VITALS
SYSTOLIC BLOOD PRESSURE: 131 MMHG | BODY MASS INDEX: 29.78 KG/M2 | HEART RATE: 67 BPM | HEIGHT: 70 IN | WEIGHT: 208 LBS | DIASTOLIC BLOOD PRESSURE: 85 MMHG

## 2019-11-18 DIAGNOSIS — M76.62 TENDONITIS, ACHILLES, LEFT: Primary | ICD-10-CM

## 2019-11-18 DIAGNOSIS — M72.2 PLANTAR FASCIITIS, LEFT: ICD-10-CM

## 2019-11-18 PROCEDURE — 99213 OFFICE O/P EST LOW 20 MIN: CPT | Performed by: PODIATRIST

## 2019-11-18 RX ORDER — METHYLPREDNISOLONE 4 MG
4 TABLET, DOSE PACK ORAL SEE ADMIN INSTRUCTIONS
Qty: 21 TABLET | Refills: 0 | Status: SHIPPED | OUTPATIENT
Start: 2019-11-18 | End: 2020-07-09

## 2019-11-18 ASSESSMENT — PAIN SCALES - GENERAL: PAINLEVEL: MILD PAIN (3)

## 2019-11-18 ASSESSMENT — MIFFLIN-ST. JEOR: SCORE: 1751.79

## 2019-11-18 NOTE — PROGRESS NOTES
Carmelo returns to the office for reevaluation of the left foot.  The patient relates following the instructions given at the last visit with noted more pain.  The patient relates overall less  improvement in pain and function of the left foot.  The patient relates no other problems.    PAST MEDICAL HISTORY:   Past Medical History:   Diagnosis Date     Calculus of kidney      Headache(784.0)     likely stress     Keratoconus      Uses contact lenses        BMI= Body mass index is 30.28 kg/m .    Weight management plan: Patient was referred to their PCP to discuss a diet and exercise plan.    Physical Exam:    General: The patient appears to have a pleasant mental affect.    Lower extremity physical exam:  Neurovascular status is intact with palpable pedal pulses and intact epicritic sensations.  Muscular exam is within normal limits to major muscle groups.  Integument is intact.      One notes decreased edema.  One notes pain on palpation of the distal insertion point of the Achilles tendon on the left.  Noted pain on palpation over the lateral aspect of the left heel at the insertion point of the plantar fascia.  No surrounding erythema noted.  One notes a tight gastroc complex in the left lower extremity.         Assessment:      ICD-10-CM    1. Tendonitis, Achilles, left M76.62 order for DME     methylPREDNISolone (MEDROL DOSEPAK) 4 MG tablet therapy pack     PHYSICAL THERAPY REFERRAL   2. Plantar fasciitis, left M72.2 order for DME     methylPREDNISolone (MEDROL DOSEPAK) 4 MG tablet therapy pack     PHYSICAL THERAPY REFERRAL       Plan:  I have explained to Carmelo about the conditions.  At this time, the patient was instructed on icing, stretching, tissue massage and support.  The patient was fitted with a cam boot that will aid in offloading the tension forces to the soft tissues and prevent further inflammation.  To reduce the amount of current inflammation, the patient was prescribed a Medrol Dosepak to be  taken daily with food and instructed to stop taking if any stomach irritation or swelling in extremities are noted.  The patient was referred to [place] [facility] therapy for [place] fascial release of the Achilles tendon and plantar fascia.  The patient will return in four weeks for reevaluation if the symptoms do not resolve.      Disclaimer: This note consists of symbols derived from keyboarding, dictation and/or voice recognition software. As a result, there may be errors in the script that have gone undetected. Please consider this when interpreting information found in this chart.       MARYAM Espinal D.P.M., FJONO.F.A.S.

## 2019-11-18 NOTE — NURSING NOTE
"Chief Complaint   Patient presents with     RECHECK     Achilies tendoniits on left foot, \"worse since last visit\"       Initial /85   Pulse 67   Ht 1.765 m (5' 9.5\")   Wt 94.3 kg (208 lb)   BMI 30.28 kg/m   Estimated body mass index is 30.28 kg/m  as calculated from the following:    Height as of this encounter: 1.765 m (5' 9.5\").    Weight as of this encounter: 94.3 kg (208 lb).  Medications and allergies reviewed.      Terrie MORALES MA    "

## 2019-11-18 NOTE — LETTER
11/18/2019         RE: Randall J Jennissen  [address] [facility] [id number]        Dear Colleague,    Thank you for referring your patient, Randall J Jennissen, to the [facility]. Please see a copy of my visit note below.    Carmelo returns to the office for reevaluation of the left foot.  The patient relates following the instructions given at the last visit with noted more pain.  The patient relates overall less  improvement in pain and function of the left foot.  The patient relates no other problems.    PAST MEDICAL HISTORY:   Past Medical History:   Diagnosis Date     Calculus of kidney      Headache(784.0)     likely stress     Keratoconus      Uses contact lenses        BMI= Body mass index is 30.28 kg/m .    Weight management plan: Patient was referred to their PCP to discuss a diet and exercise plan.    Physical Exam:    General: The patient appears to have a pleasant mental affect.    Lower extremity physical exam:  Neurovascular status is intact with palpable pedal pulses and intact epicritic sensations.  Muscular exam is within normal limits to major muscle groups.  Integument is intact.      One notes decreased edema.  One notes pain on palpation of the distal insertion point of the Achilles tendon on the left.  Noted pain on palpation over the lateral aspect of the left heel at the insertion point of the plantar fascia.  No surrounding erythema noted.  One notes a tight gastroc complex in the left lower extremity.         Assessment:      ICD-10-CM    1. Tendonitis, Achilles, left M76.62 order for DME     methylPREDNISolone (MEDROL DOSEPAK) 4 MG tablet therapy pack     PHYSICAL THERAPY REFERRAL   2. Plantar fasciitis, left M72.2 order for DME     methylPREDNISolone (MEDROL DOSEPAK) 4 MG tablet therapy pack     PHYSICAL THERAPY REFERRAL       Plan:  I have explained to Carmelo about the conditions.  At this time, the patient was instructed on icing,  stretching, tissue massage and support.  The patient was fitted with a cam boot that will aid in offloading the tension forces to the soft tissues and prevent further inflammation.  To reduce the amount of current inflammation, the patient was prescribed a Medrol Dosepak to be taken daily with food and instructed to stop taking if any stomach irritation or swelling in extremities are noted.  The patient was referred to [facility] for [place] fascial release of the Achilles tendon and plantar fascia.  The patient will return in four weeks for reevaluation if the symptoms do not resolve.      Disclaimer: This note consists of symbols derived from keyboarding, dictation and/or voice recognition software. As a result, there may be errors in the script that have gone undetected. Please consider this when interpreting information found in this chart.       ANDREW Vu.P.ALYSE., F.A.C.F.A.S.      Again, thank you for allowing me to participate in the care of your patient.        Sincerely,        Jp Espinal DPM

## 2019-11-20 ENCOUNTER — HOSPITAL ENCOUNTER (OUTPATIENT)
Dept: PHYSICAL THERAPY | Facility: CLINIC | Age: 59
Setting detail: THERAPIES SERIES
End: 2019-11-20
Attending: PODIATRIST
Payer: COMMERCIAL

## 2019-11-20 DIAGNOSIS — M72.2 PLANTAR FASCIITIS, LEFT: ICD-10-CM

## 2019-11-20 DIAGNOSIS — M76.62 TENDONITIS, ACHILLES, LEFT: ICD-10-CM

## 2019-11-20 PROCEDURE — 97140 MANUAL THERAPY 1/> REGIONS: CPT | Mod: GP | Performed by: PHYSICAL THERAPIST

## 2019-11-20 PROCEDURE — 97110 THERAPEUTIC EXERCISES: CPT | Mod: GP | Performed by: PHYSICAL THERAPIST

## 2019-11-20 PROCEDURE — 97161 PT EVAL LOW COMPLEX 20 MIN: CPT | Mod: GP | Performed by: PHYSICAL THERAPIST
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
## 2019-11-27 ENCOUNTER — HOSPITAL ENCOUNTER (OUTPATIENT)
Dept: PHYSICAL THERAPY | Facility: CLINIC | Age: 59
Setting detail: THERAPIES SERIES
End: 2019-11-27
Attending: PODIATRIST
Payer: COMMERCIAL

## 2019-11-27 PROCEDURE — 97140 MANUAL THERAPY 1/> REGIONS: CPT | Mod: GP | Performed by: PHYSICAL THERAPIST

## 2019-11-27 PROCEDURE — 97110 THERAPEUTIC EXERCISES: CPT | Mod: GP | Performed by: PHYSICAL THERAPIST

## 2019-12-03 ENCOUNTER — HOSPITAL ENCOUNTER (OUTPATIENT)
Dept: PHYSICAL THERAPY | Facility: CLINIC | Age: 59
Setting detail: THERAPIES SERIES
End: 2019-12-03
Attending: PODIATRIST
Payer: COMMERCIAL

## 2019-12-03 PROCEDURE — 97110 THERAPEUTIC EXERCISES: CPT | Mod: GP | Performed by: PHYSICAL THERAPIST

## 2019-12-03 PROCEDURE — 97140 MANUAL THERAPY 1/> REGIONS: CPT | Mod: GP | Performed by: PHYSICAL THERAPIST

## 2020-01-02 NOTE — PROGRESS NOTES
Outpatient Physical Therapy Discharge Note     Patient: Randall J Jennissen  : 1960    Beginning/End Dates of Reporting Period:  19 to 2020    Referring Provider: Dr Espinal    Therapy Diagnosis: L achilles tendonitis, plantar fasciitis     Client Self Report: Pt notes that after finishing steroids did move backward some. Pain is very manageable. Pain under foot (PF ) has resolved. Achilles gets tight with sitting. Getting ex in 1x/day. Wearing Trilock brace when up and about as well as heel lift. Walked out to deer stand through a foot of snow and did well.     Objective Measurements:  Objective Measure: SL heel raise  Details: Continued difficulty on L, able to perform with 70% body weight  Objective Measure: dorsiflexion L   Details: 15 degrees  Objective Measure: LEFS  Details: 59(36 initial session)            Goals:  Goal Identifier 1   Goal Description Pt will be able to walk 20 minutes on uneven surfaces, outdoors, in the woods etc for hunting withotu increased pain   Target Date 01/15/20   Date Met  19   Progress:     Goal Identifier 2   Goal Description Pt will demonstrate 5/5 plantarflexion strength to perform single leg heel raise   Target Date 01/15/20   Date Met      Progress: progressing toward     Goal Identifier 3   Goal Description Pt will improve LEFS > 50/80 for improved tolerance to functional activity   Target Date 01/15/20   Date Met  19   Progress:         Progress Toward Goals:   Progress this reporting period: Pt demonstrating improvements in range of motion, strength, met 2/3 goals, progress toward final goal. Pt independent with HEP for continued strength and stretching and is adamant that able to continue independently at home as pleased with progress thus far. Chart was held open 30 days with no recheck needed, will discharge at this time.     Plan:  Discharge from therapy.    Discharge:    Reason for Discharge: Patient has met 2/3 goals.  Patient chooses to  discontinue therapy.    Equipment Issued: na    Discharge Plan: Patient to continue home program.

## 2020-07-08 ENCOUNTER — OFFICE VISIT (OUTPATIENT)
Dept: FAMILY MEDICINE | Facility: CLINIC | Age: 60
End: 2020-07-08
Payer: COMMERCIAL

## 2020-07-08 VITALS
RESPIRATION RATE: 18 BRPM | BODY MASS INDEX: 30.07 KG/M2 | WEIGHT: 206.6 LBS | HEART RATE: 74 BPM | SYSTOLIC BLOOD PRESSURE: 134 MMHG | TEMPERATURE: 97.8 F | DIASTOLIC BLOOD PRESSURE: 80 MMHG | OXYGEN SATURATION: 96 %

## 2020-07-08 DIAGNOSIS — T14.8XXA SUPERFICIAL ABRASION: ICD-10-CM

## 2020-07-08 DIAGNOSIS — M25.531 RIGHT WRIST PAIN: Primary | ICD-10-CM

## 2020-07-08 PROCEDURE — 99213 OFFICE O/P EST LOW 20 MIN: CPT | Performed by: PHYSICIAN ASSISTANT

## 2020-07-08 RX ORDER — CEPHALEXIN 500 MG/1
500 CAPSULE ORAL 3 TIMES DAILY
Qty: 21 CAPSULE | Refills: 0 | Status: SHIPPED | OUTPATIENT
Start: 2020-07-08 | End: 2020-07-15

## 2020-07-08 ASSESSMENT — PAIN SCALES - GENERAL: PAINLEVEL: EXTREME PAIN (9)

## 2020-07-08 NOTE — PROGRESS NOTES
Subjective     Randall J Jennissen is a 59 year old male who presents to clinic today for the following health issues:    HPI   Hand pain       Duration: Since last Thursday     Description (location/character/radiation): Patient states that on Thursday he got a scratch or bug bite( scab at the base of the right thumb) Since then he has numbness in that area and tingling towards the tip of his thumb. Says when grasps something and has to turn his wrist down he gets a sharp pain     Intensity:  moderate, severe    Accompanying signs and symptoms: none     History (similar episodes/previous evaluation): None    Precipitating or alleviating factors: None    Therapies tried and outcome: Ibuprofen in the evening, ice    Denies any fevers, chills, nausea, vomiting, fatigue.   No redness streaking up the arm.   No pain at rest.     Patient Active Problem List   Diagnosis     Hyperlipidemia LDL goal <160     Allergic conjunctivitis     Non morbid obesity due to excess calories     JO (obstructive sleep apnea)     Past Surgical History:   Procedure Laterality Date     COLONOSCOPY       COLONOSCOPY N/A 2016    Procedure: COLONOSCOPY;  Surgeon: Sean Lopez MD;  Location: [facility]     SURGICAL HISTORY OF -       ORIF left leg     SURGICAL HISTORY OF -       low back nerve block?       Social History     Tobacco Use     Smoking status: Former Smoker     Last attempt to quit: 2004     Years since quittin.5     Smokeless tobacco: Never Used   Substance Use Topics     Alcohol use: Yes     Alcohol/week: 0.0 standard drinks     Comment: 12 pack in a week     Family History   Problem Relation Age of Onset     Cancer - colorectal No family hx of      Prostate Cancer No family hx of      C.A.D. No family hx of          Current Outpatient Medications   Medication Sig Dispense Refill     cephALEXin (KEFLEX) 500 MG capsule Take 1 capsule (500 mg) by mouth 3 times daily for 7 days 21 capsule 0     Cyanocobalamin (VITAMIN B  12) 100 MCG LOZG        IBUPROFEN 200 MG OR TABS 600 mg by mouth at bedtime 120 0     vitamin D3 (CHOLECALCIFEROL) 2000 units (50 mcg) tablet Take 1 tablet by mouth daily       Multiple Vitamin (MULTI-VITAMINS) TABS Take 1 tablet by mouth       order for DME Equipment being ordered: quarter inch heel lift (Patient not taking: Reported on 7/8/2020) 1 Units 0     phytonadione (MEPHYTON/VIT K) 5 MG tablet Take 5 mg by mouth once       No Known Allergies    Reviewed and updated as needed this visit by Provider  Tobacco  Allergies  Meds  Problems  Med Hx  Surg Hx  Fam Hx         Review of Systems   Constitutional, msk, skin, neuro, cardiovascular, pulmonary, gi and gu systems are negative, except as otherwise noted.      Objective    /80 (BP Location: Right arm, Patient Position: Sitting, Cuff Size: Adult Large)   Pulse 74   Temp 97.8  F (36.6  C) (Tympanic)   Resp 18   Wt 93.7 kg (206 lb 9.6 oz)   SpO2 96%   BMI 30.07 kg/m    Body mass index is 30.07 kg/m .  Physical Exam   Constitutional: healthy, alert, and no distress  Head: Normocephalic. Atraumatic  Eyes: No conjunctival injection, sclera anicteric  Respiratory: No resp distress.  Musculoskeletal: There is mild swelling and mild tenderness along the radial wrist right. FROM of the wrist. Finkelstein test positive on the R.   Neurologic: Gait normal. CN 2-12 grossly intact  Psychiatric: mentation appears normal and affect normal/bright  Skin: There is a scabbed abrasion along the radial aspect of the right wrist with mild surrounding erythema. No streaking.     Diagnostic Test Results:  none         Assessment & Plan   (M25.531) Right wrist pain  (primary encounter diagnosis)  Comment: 5 days of R wrist and thumb pain after he developed a superficial abrasion over the radial aspect of the R wrist. He only has pain with movement of the wrist. Exam with only minimal surrounding erythema with mild tenderness. There is no streaking, FROM of the wrist,  and Finkelsteins test is positive. Given his abrasion and redness, considered cellulitis, tenosynovitis, and septic joint but there is no significant erythema, no streaking, and pain is only with movement of the wrist. More likely I suspect that this is either tendonitis or the inflammation surrounding the abrasion is causing some inflammation of the surrounding structures. Discussed this with the patient and he agreed to treat this with rest and Ibuprofen, and if symptoms not improve or worsening over the next few days, to fill a Rx for Keflex for a mild cellulitis. If he does this, he will call and let us know so that we can follow up accordingly.   Plan: cephALEXin (KEFLEX) 500 MG capsule    (T14.8XXA) Superficial abrasion  Comment: Treat as above.   Plan: cephALEXin (KEFLEX) 500 MG capsule    Return in about 1 week (around 7/15/2020), or if symptoms worsen or fail to improve, for In-Clinic Visit.    Candelario Sanabria PA-C  [facility]

## 2020-07-08 NOTE — PATIENT INSTRUCTIONS
Lets treat you now for tendonitis of one of the tendons in your wrist. Follow the steps below.     If your redness, pain or swelling get worse, the antibiotic Keflex is at the pharmacy for you to . If you do pick this up, let me know over the phone so that we can follow up appropriately.     The majority of swelling comes in the first 48 hours after an injury.  You can reduce the effects of this by applying cold to the injury immediately after an injury and for at least 20 minutes of every hour as able.  Rest and elevation of the injured area (if possible) and anti-inflammatory medications such as ibuprofen or naproxen will also be helpful during this time.    As a general rule, the body heals itself in response to the forces that are applied to it.  In other words, if you just rest, you'll never fully recover. After the initial rest period, gently moving the injured joint (if appropriate) will also help speed ultimate recovery.  If injuries are mild to moderate, you can progress to full range of motion without resistance.  As this becomes easier and more comfortable over the course of days, this area can then begin to be tested carefully with controlled weight-bearing or resistance activities.    If you have additional questions about specific rehabilitation over time, please contact me.    Patient Education     RICE     Rest an injury, elevate it, and use ice and compression as directed.   RICE stands for rest, ice, compression, and elevation. These can limit pain and swelling after an injury. RICE may be recommended to help treat breaks (fractures), sprains, strains, and bruises or bumps.   Home care  Here are the details of RICE:    Rest. Limit the use of the injured body part. This helps prevent further damage to the body part and gives it time to heal. In some cases, you may need a sling, brace, splint, or cast to help keep the body part still until it has healed.    Ice. Applying ice right after an  injury helps relieve pain and swelling. To make an ice pack, put ice cubes in a plastic bag that seals at the top. Wrap the bag in a clean, thin towel or cloth. Then place it over the injured area. Do this for 10 to 15 minutes every 3 to 4 hours. Continue for the next 1 to 3 days or until your symptoms improve. Never put ice directly on your skin. Don't ice an area longer than 15 minutes at a time.    Compression. Putting pressure on an injury helps reduce swelling and provides support. Wrap the injured area firmly with an elastic bandage or wrap. Make sure not to wrap the bandage too tightly or you will cut off blood flow to the injured area. If your bandage loosens, rewrap it.    Elevation. Keeping an injury raised or elevated above the level of your heart reduces swelling, pain, and throbbing. For instance, if you have a broken leg, it may help to rest your leg on several pillows when sitting or lying down. Try to keep the injured area elevated as often as possible.  Follow-up care  Follow up with your healthcare provider, or as advised.  When to seek medical advice  Call your healthcare provider right away if any of these occur:    Fever of 100.4 F (38 C) or higher, or as directed by your healthcare provider    Chills    Increased pain or swelling in the injured body part    Injured body part becomes cold, blue, numb, or tingly    Signs of infection. These include warmth in the skin, redness, drainage, or bad smell coming from the injured body part.  Date Last Reviewed: 6/1/2018 2000-2019 [employer]. [address] [place] [address], [place] [address]. All rights reserved. This information is not intended as a substitute for professional medical care. Always follow your healthcare professional's instructions.

## 2021-05-14 ENCOUNTER — IMMUNIZATION (OUTPATIENT)
Dept: LAB | Facility: CLINIC | Age: 61
End: 2021-05-14
Payer: COMMERCIAL

## 2021-07-04 ENCOUNTER — HEALTH MAINTENANCE LETTER (OUTPATIENT)
Age: 61
End: 2021-07-04

## 2021-07-26 ENCOUNTER — RECORDS - HEALTHEAST (OUTPATIENT)
Dept: ADMINISTRATIVE | Facility: CLINIC | Age: 61
End: 2021-07-26

## 2021-10-24 ENCOUNTER — HEALTH MAINTENANCE LETTER (OUTPATIENT)
Age: 61
End: 2021-10-24

## 2021-11-17 ENCOUNTER — HOSPITAL ENCOUNTER (EMERGENCY)
Facility: CLINIC | Age: 61
Discharge: HOME | End: 2021-11-17
Attending: EMERGENCY MEDICINE | Admitting: EMERGENCY MEDICINE
Payer: COMMERCIAL

## 2021-11-17 VITALS
BODY MASS INDEX: 26.48 KG/M2 | SYSTOLIC BLOOD PRESSURE: 167 MMHG | OXYGEN SATURATION: 100 % | WEIGHT: 185 LBS | HEIGHT: 70 IN | HEART RATE: 81 BPM | TEMPERATURE: 98.2 F | RESPIRATION RATE: 16 BRPM | DIASTOLIC BLOOD PRESSURE: 96 MMHG

## 2021-11-17 DIAGNOSIS — T24.212A PARTIAL THICKNESS BURN OF LEFT THIGH, INITIAL ENCOUNTER: ICD-10-CM

## 2021-11-17 DIAGNOSIS — T23.292A PARTIAL THICKNESS BURN OF MULTIPLE SITES OF LEFT HAND, INITIAL ENCOUNTER: ICD-10-CM

## 2021-11-17 PROCEDURE — 16020 DRESS/DEBRID P-THICK BURN S: CPT | Performed by: EMERGENCY MEDICINE

## 2021-11-17 PROCEDURE — 99283 EMERGENCY DEPT VISIT LOW MDM: CPT | Performed by: EMERGENCY MEDICINE

## 2021-11-17 PROCEDURE — 99283 EMERGENCY DEPT VISIT LOW MDM: CPT | Mod: 25 | Performed by: EMERGENCY MEDICINE

## 2021-11-17 PROCEDURE — 250N000009 HC RX 250

## 2021-11-17 PROCEDURE — 250N000009 HC RX 250: Performed by: EMERGENCY MEDICINE

## 2021-11-17 RX ORDER — SILVER SULFADIAZINE 10 MG/G
CREAM TOPICAL ONCE
Status: COMPLETED | OUTPATIENT
Start: 2021-11-17 | End: 2021-11-17

## 2021-11-17 RX ORDER — HYDROCODONE BITARTRATE AND ACETAMINOPHEN 5; 325 MG/1; MG/1
1-2 TABLET ORAL EVERY 6 HOURS PRN
Qty: 15 TABLET | Refills: 0 | Status: SHIPPED | OUTPATIENT
Start: 2021-11-17 | End: 2021-11-18

## 2021-11-17 RX ORDER — LIDOCAINE HYDROCHLORIDE 20 MG/ML
JELLY TOPICAL
Status: COMPLETED
Start: 2021-11-17 | End: 2021-11-17

## 2021-11-17 RX ORDER — LIDOCAINE HYDROCHLORIDE 20 MG/ML
JELLY TOPICAL ONCE
Status: COMPLETED | OUTPATIENT
Start: 2021-11-17 | End: 2021-11-17

## 2021-11-17 RX ADMIN — LIDOCAINE HYDROCHLORIDE: 20 JELLY TOPICAL at 22:27

## 2021-11-17 RX ADMIN — SILVER SULFADIAZINE: 10 CREAM TOPICAL at 23:34

## 2021-11-17 ASSESSMENT — ENCOUNTER SYMPTOMS
WOUND: 1
RESPIRATORY NEGATIVE: 1
NEUROLOGICAL NEGATIVE: 1

## 2021-11-17 ASSESSMENT — MIFFLIN-ST. JEOR: SCORE: 1645.4

## 2021-11-18 RX ORDER — HYDROCODONE BITARTRATE AND ACETAMINOPHEN 5; 325 MG/1; MG/1
1 TABLET ORAL EVERY 6 HOURS PRN
Qty: 15 TABLET | Refills: 0 | Status: SHIPPED | OUTPATIENT
Start: 2021-11-18 | End: 2023-10-03
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
## 2021-12-27 ENCOUNTER — VIRTUAL VISIT (OUTPATIENT)
Dept: FAMILY MEDICINE | Facility: CLINIC | Age: 61
End: 2021-12-27
Payer: COMMERCIAL

## 2021-12-27 ENCOUNTER — TELEPHONE (OUTPATIENT)
Dept: NURSING | Facility: CLINIC | Age: 61
End: 2021-12-27

## 2021-12-27 DIAGNOSIS — R05.9 COUGH: Primary | ICD-10-CM

## 2021-12-27 DIAGNOSIS — R50.9 FEVER, UNSPECIFIED FEVER CAUSE: ICD-10-CM

## 2021-12-27 PROCEDURE — 99213 OFFICE O/P EST LOW 20 MIN: CPT | Mod: 95 | Performed by: FAMILY MEDICINE

## 2021-12-27 NOTE — TELEPHONE ENCOUNTER
Patient calling for E-visit appointment to be tested for covid.   Transferred to scheduling to guide him for completing MyChart appointment process that patient has already begun today.    Yadi Ohara RN, Nurse Advisor 10:51 AM 12/27/2021    COVID 19 Nurse Triage Plan/Patient Instructions    Please be aware that novel coronavirus (COVID-19) may be circulating in the community. If you develop symptoms such as fever, cough, or SOB or if you have concerns about the presence of another infection including coronavirus (COVID-19), please contact your health care provider or visit [email or web address].     Disposition/Instructions    Home care recommended. Follow home care protocol based instructions.    Thank you for taking steps to prevent the spread of this virus.  o Limit your contact with others.  o Wear a simple mask to cover your cough.  o Wash your hands well and often.    Resources    [facility]: About COVID-19: [email or web address]    CDC: What to Do If You're Sick: www.cdc.gov/coronavirus/2019-ncov/about/steps-when-sick.html    CDC: Ending Home Isolation: www.cdc.gov/coronavirus/2019-ncov/hcp/disposition-in-home-patients.html     CDC: Caring for Someone: www.cdc.gov/coronavirus/2019-ncov/if-you-are-sick/care-for-someone.html     [facility]: Interim Guidance for Hospital Discharge to Home: www.health.[facility].mn.us/diseases/coronavirus/hcp/hospdischarge.pdf    [facility] clinical trials (COVID-19 research studies): [email or web address].[email or web address]/n-clinical-trials     Below are the COVID-19 hotlines at the [place] Department of Health ([facility]). Interpreters are available.   o For health questions: Call 812-105-1493 or 1-583.671.6955 (7 a.m. to 7 p.m.)  o For questions about schools and childcare: Call 021-166-6270 or 1-730.454.9959 (7 a.m. to 7 p.m.)

## 2021-12-27 NOTE — PROGRESS NOTES
Nader is a 61 year old who is being evaluated via a billable telephone visit.      What phone number would you like to be contacted at? [phone]  How would you like to obtain your AVS? MyChart    Assessment & Plan     Cough  Fever, unspecified fever cause  Symptoms consistent with possible covid or influenza. Will obtain testing for both. If negative and continues to have symptoms should be seen in clinic for further evaluation and cares. Isolate and quarantine until results have returned. Discussed symptomatic cares. All questions answered.   - Symptomatic; Yes; 12/23/2021 COVID-19 Virus (Coronavirus) by PCR  - Influenza A & B Antigen - Clinic Collect      Daniel Loja,   [facility]    Dorys Doe is a 61 year old who presents for the following health issues     HPI       Concern for COVID-19  About how many days ago did these symptoms start? 4 days ago  Is this your first visit for this illness? Yes  In the 14 days before your symptoms started, have you had close contact with someone with COVID-19 (Coronavirus)? I do not know.  Do you have a fever or chills? Yes, the highest temperature was 102.2  Are you having new or worsening difficulty breathing? Yes   Please describe what kind of difficulty you are having breathing:Mild dyspnea (able to do ADLs without difficulty, mild shortness of breath with activities such as climbing one or two flights of stairs or walking briskly)  Do you have new or worsening cough? Yes, I am coughing up mucus.  Have you had any new or unexplained body aches? YES    Have you experienced any of the following NEW symptoms?    Headache: YES    Sore throat: No    Loss of taste or smell: No    Chest pain: mild    Diarrhea: No    Rash: No  What treatments have you tried? Ibuprofen  Who do you live with? wife  Are you, or a household member, a healthcare worker or a ? No  Do you live in a nursing home, group home, or shelter? No  Do you have  a way to get food/medications if quarantined? Yes, I have a friend or family member who can help me.    Feels a little short of breath.   Coughing up crud.   Having sweats at night.     Review of Systems   Constitutional, HEENT, cardiovascular, pulmonary, gi and gu systems are negative, except as otherwise noted.      Objective           Vitals:  No vitals were obtained today due to virtual visit.    Physical Exam   healthy, alert and no distress  PSYCH: Alert and oriented times 3; coherent speech, normal   rate and volume, able to articulate logical thoughts, able   to abstract reason, no tangential thoughts, no hallucinations   or delusions  His affect is normal  RESP: No cough, no audible wheezing, able to talk in full sentences  Remainder of exam unable to be completed due to telephone visits            Phone call duration: 7 minutes

## 2021-12-28 ENCOUNTER — LAB (OUTPATIENT)
Dept: FAMILY MEDICINE | Facility: CLINIC | Age: 61
End: 2021-12-28
Attending: FAMILY MEDICINE
Payer: COMMERCIAL

## 2021-12-28 ENCOUNTER — MYC MEDICAL ADVICE (OUTPATIENT)
Dept: FAMILY MEDICINE | Facility: CLINIC | Age: 61
End: 2021-12-28

## 2021-12-28 DIAGNOSIS — R05.9 COUGH: ICD-10-CM

## 2021-12-28 LAB
FLUAV AG SPEC QL IA: NEGATIVE
FLUBV AG SPEC QL IA: NEGATIVE
SARS-COV-2 RNA RESP QL NAA+PROBE: NEGATIVE

## 2021-12-28 PROCEDURE — U0003 INFECTIOUS AGENT DETECTION BY NUCLEIC ACID (DNA OR RNA); SEVERE ACUTE RESPIRATORY SYNDROME CORONAVIRUS 2 (SARS-COV-2) (CORONAVIRUS DISEASE [COVID-19]), AMPLIFIED PROBE TECHNIQUE, MAKING USE OF HIGH THROUGHPUT TECHNOLOGIES AS DESCRIBED BY CMS-2020-01-R: HCPCS

## 2021-12-28 PROCEDURE — 99207 PR NO CHARGE LOS: CPT

## 2021-12-28 PROCEDURE — 87804 INFLUENZA ASSAY W/OPTIC: CPT

## 2021-12-28 PROCEDURE — U0005 INFEC AGEN DETEC AMPLI PROBE: HCPCS
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
## 2021-12-29 NOTE — TELEPHONE ENCOUNTER
I would be happy to see patient this Friday ( not in clinic tomorrow, full today) Can use a same day slot or hospital follow up slot. If needs to be seen sooner should present to urgent care or ER.

## 2021-12-31 ENCOUNTER — OFFICE VISIT (OUTPATIENT)
Dept: FAMILY MEDICINE | Facility: CLINIC | Age: 61
End: 2021-12-31
Payer: COMMERCIAL

## 2021-12-31 ENCOUNTER — ANCILLARY PROCEDURE (OUTPATIENT)
Dept: GENERAL RADIOLOGY | Facility: CLINIC | Age: 61
End: 2021-12-31
Attending: FAMILY MEDICINE
Payer: COMMERCIAL

## 2021-12-31 VITALS
TEMPERATURE: 96.6 F | OXYGEN SATURATION: 98 % | DIASTOLIC BLOOD PRESSURE: 70 MMHG | SYSTOLIC BLOOD PRESSURE: 120 MMHG | BODY MASS INDEX: 26.63 KG/M2 | HEART RATE: 71 BPM | RESPIRATION RATE: 16 BRPM | HEIGHT: 70 IN | WEIGHT: 186 LBS

## 2021-12-31 DIAGNOSIS — R05.9 COUGH: ICD-10-CM

## 2021-12-31 DIAGNOSIS — Z72.0 TOBACCO ABUSE: ICD-10-CM

## 2021-12-31 DIAGNOSIS — J40 BRONCHITIS: ICD-10-CM

## 2021-12-31 DIAGNOSIS — R61 NIGHT SWEATS: Primary | ICD-10-CM

## 2021-12-31 LAB
ALBUMIN SERPL-MCNC: 3.2 G/DL (ref 3.4–5)
ALP SERPL-CCNC: 62 U/L (ref 40–150)
ALT SERPL W P-5'-P-CCNC: 47 U/L (ref 0–70)
ANION GAP SERPL CALCULATED.3IONS-SCNC: 3 MMOL/L (ref 3–14)
AST SERPL W P-5'-P-CCNC: 43 U/L (ref 0–45)
BASOPHILS # BLD AUTO: 0 10E3/UL (ref 0–0.2)
BASOPHILS NFR BLD AUTO: 0 %
BILIRUB SERPL-MCNC: 0.3 MG/DL (ref 0.2–1.3)
BUN SERPL-MCNC: 23 MG/DL (ref 7–30)
CALCIUM SERPL-MCNC: 8.8 MG/DL (ref 8.5–10.1)
CHLORIDE BLD-SCNC: 106 MMOL/L (ref 94–109)
CO2 SERPL-SCNC: 29 MMOL/L (ref 20–32)
CREAT SERPL-MCNC: 0.9 MG/DL (ref 0.66–1.25)
CRP SERPL-MCNC: 3.8 MG/L (ref 0–8)
EOSINOPHIL # BLD AUTO: 0.1 10E3/UL (ref 0–0.7)
EOSINOPHIL NFR BLD AUTO: 2 %
ERYTHROCYTE [DISTWIDTH] IN BLOOD BY AUTOMATED COUNT: 12.5 % (ref 10–15)
GFR SERPL CREATININE-BSD FRML MDRD: >90 ML/MIN/1.73M2
GLUCOSE BLD-MCNC: 92 MG/DL (ref 70–99)
HCT VFR BLD AUTO: 41.2 % (ref 40–53)
HGB BLD-MCNC: 13.8 G/DL (ref 13.3–17.7)
IMM GRANULOCYTES # BLD: 0 10E3/UL
IMM GRANULOCYTES NFR BLD: 0 %
LYMPHOCYTES # BLD AUTO: 1.9 10E3/UL (ref 0.8–5.3)
LYMPHOCYTES NFR BLD AUTO: 42 %
MCH RBC QN AUTO: 31.1 PG (ref 26.5–33)
MCHC RBC AUTO-ENTMCNC: 33.5 G/DL (ref 31.5–36.5)
MCV RBC AUTO: 93 FL (ref 78–100)
MONOCYTES # BLD AUTO: 0.4 10E3/UL (ref 0–1.3)
MONOCYTES NFR BLD AUTO: 9 %
NEUTROPHILS # BLD AUTO: 2.2 10E3/UL (ref 1.6–8.3)
NEUTROPHILS NFR BLD AUTO: 47 %
NRBC # BLD AUTO: 0 10E3/UL
NRBC BLD AUTO-RTO: 0 /100
PLAT MORPH BLD: ABNORMAL
PLATELET # BLD AUTO: 237 10E3/UL (ref 150–450)
POTASSIUM BLD-SCNC: 4.7 MMOL/L (ref 3.4–5.3)
PROT SERPL-MCNC: 7.3 G/DL (ref 6.8–8.8)
RBC # BLD AUTO: 4.44 10E6/UL (ref 4.4–5.9)
RBC MORPH BLD: ABNORMAL
SODIUM SERPL-SCNC: 138 MMOL/L (ref 133–144)
TSH SERPL DL<=0.005 MIU/L-ACNC: 2.29 MU/L (ref 0.4–4)
VARIANT LYMPHS BLD QL SMEAR: PRESENT
WBC # BLD AUTO: 4.6 10E3/UL (ref 4–11)

## 2021-12-31 PROCEDURE — 71046 X-RAY EXAM CHEST 2 VIEWS: CPT | Performed by: RADIOLOGY

## 2021-12-31 PROCEDURE — 36415 COLL VENOUS BLD VENIPUNCTURE: CPT | Performed by: FAMILY MEDICINE

## 2021-12-31 PROCEDURE — 86140 C-REACTIVE PROTEIN: CPT | Performed by: FAMILY MEDICINE

## 2021-12-31 PROCEDURE — 80050 GENERAL HEALTH PANEL: CPT | Performed by: FAMILY MEDICINE

## 2021-12-31 PROCEDURE — 99214 OFFICE O/P EST MOD 30 MIN: CPT | Performed by: FAMILY MEDICINE

## 2021-12-31 RX ORDER — NICOTINE 21 MG/24HR
1 PATCH, TRANSDERMAL 24 HOURS TRANSDERMAL EVERY 24 HOURS
Qty: 14 PATCH | Refills: 0 | Status: SHIPPED | OUTPATIENT
Start: 2021-12-31 | End: 2023-10-03

## 2021-12-31 RX ORDER — AZITHROMYCIN 250 MG/1
TABLET, FILM COATED ORAL
Qty: 6 TABLET | Refills: 0 | Status: SHIPPED | OUTPATIENT
Start: 2021-12-31 | End: 2022-01-05

## 2021-12-31 RX ORDER — NICOTINE 21 MG/24HR
1 PATCH, TRANSDERMAL 24 HOURS TRANSDERMAL EVERY 24 HOURS
Qty: 42 PATCH | Refills: 0 | Status: SHIPPED | OUTPATIENT
Start: 2021-12-31 | End: 2023-10-03

## 2021-12-31 ASSESSMENT — MIFFLIN-ST. JEOR: SCORE: 1644.94

## 2021-12-31 NOTE — PATIENT INSTRUCTIONS
Lab work today     Chest xray today.     Start on z-kaycee      Tylenol and ibuprofen for aches and pains.     Nicotine patches and gum for smoking cessation.

## 2022-01-03 DIAGNOSIS — D72.820 LYMPHOCYTOSIS: Primary | ICD-10-CM

## 2022-07-31 ENCOUNTER — HEALTH MAINTENANCE LETTER (OUTPATIENT)
Age: 62
End: 2022-07-31

## 2022-10-15 ENCOUNTER — HEALTH MAINTENANCE LETTER (OUTPATIENT)
Age: 62
End: 2022-10-15

## 2023-08-20 ENCOUNTER — HEALTH MAINTENANCE LETTER (OUTPATIENT)
Age: 63
End: 2023-08-20

## 2023-10-03 ENCOUNTER — OFFICE VISIT (OUTPATIENT)
Dept: FAMILY MEDICINE | Facility: CLINIC | Age: 63
End: 2023-10-03
Payer: COMMERCIAL

## 2023-10-03 VITALS
TEMPERATURE: 98 F | BODY MASS INDEX: 29.03 KG/M2 | OXYGEN SATURATION: 97 % | DIASTOLIC BLOOD PRESSURE: 60 MMHG | RESPIRATION RATE: 14 BRPM | WEIGHT: 196 LBS | HEIGHT: 69 IN | SYSTOLIC BLOOD PRESSURE: 108 MMHG | HEART RATE: 72 BPM

## 2023-10-03 DIAGNOSIS — Z11.4 SCREENING FOR HIV (HUMAN IMMUNODEFICIENCY VIRUS): ICD-10-CM

## 2023-10-03 DIAGNOSIS — Z12.11 SCREEN FOR COLON CANCER: ICD-10-CM

## 2023-10-03 DIAGNOSIS — Z87.891 HISTORY OF TOBACCO USE: ICD-10-CM

## 2023-10-03 DIAGNOSIS — L98.9 NON-HEALING SKIN LESION OF NOSE: ICD-10-CM

## 2023-10-03 DIAGNOSIS — R06.02 SOB (SHORTNESS OF BREATH): ICD-10-CM

## 2023-10-03 DIAGNOSIS — E78.5 HYPERLIPIDEMIA LDL GOAL <160: ICD-10-CM

## 2023-10-03 DIAGNOSIS — Z11.59 NEED FOR HEPATITIS C SCREENING TEST: ICD-10-CM

## 2023-10-03 DIAGNOSIS — H18.603 KERATOCONUS OF BOTH EYES: ICD-10-CM

## 2023-10-03 DIAGNOSIS — Z00.00 ROUTINE GENERAL MEDICAL EXAMINATION AT A HEALTH CARE FACILITY: Primary | ICD-10-CM

## 2023-10-03 DIAGNOSIS — R07.89 DISCOMFORT IN CHEST: ICD-10-CM

## 2023-10-03 LAB
ALBUMIN SERPL BCG-MCNC: 4.3 G/DL (ref 3.5–5.2)
ALP SERPL-CCNC: 65 U/L (ref 40–129)
ALT SERPL W P-5'-P-CCNC: 27 U/L (ref 0–70)
ANION GAP SERPL CALCULATED.3IONS-SCNC: 12 MMOL/L (ref 7–15)
AST SERPL W P-5'-P-CCNC: 29 U/L (ref 0–45)
BILIRUB SERPL-MCNC: 0.5 MG/DL
BUN SERPL-MCNC: 21.3 MG/DL (ref 8–23)
CALCIUM SERPL-MCNC: 9.4 MG/DL (ref 8.8–10.2)
CHLORIDE SERPL-SCNC: 103 MMOL/L (ref 98–107)
CHOLEST SERPL-MCNC: 243 MG/DL
CREAT SERPL-MCNC: 1.17 MG/DL (ref 0.67–1.17)
DEPRECATED HCO3 PLAS-SCNC: 21 MMOL/L (ref 22–29)
EGFRCR SERPLBLD CKD-EPI 2021: 70 ML/MIN/1.73M2
ERYTHROCYTE [DISTWIDTH] IN BLOOD BY AUTOMATED COUNT: 13.3 % (ref 10–15)
GLUCOSE SERPL-MCNC: 108 MG/DL (ref 70–99)
HCT VFR BLD AUTO: 42.1 % (ref 40–53)
HDLC SERPL-MCNC: 65 MG/DL
HGB BLD-MCNC: 14.1 G/DL (ref 13.3–17.7)
LDLC SERPL CALC-MCNC: 138 MG/DL
MCH RBC QN AUTO: 31.2 PG (ref 26.5–33)
MCHC RBC AUTO-ENTMCNC: 33.5 G/DL (ref 31.5–36.5)
MCV RBC AUTO: 93 FL (ref 78–100)
NONHDLC SERPL-MCNC: 178 MG/DL
PLATELET # BLD AUTO: 255 10E3/UL (ref 150–450)
POTASSIUM SERPL-SCNC: 4.3 MMOL/L (ref 3.4–5.3)
PROT SERPL-MCNC: 7.1 G/DL (ref 6.4–8.3)
RBC # BLD AUTO: 4.52 10E6/UL (ref 4.4–5.9)
SODIUM SERPL-SCNC: 136 MMOL/L (ref 135–145)
TRIGL SERPL-MCNC: 201 MG/DL
WBC # BLD AUTO: 6 10E3/UL (ref 4–11)

## 2023-10-03 PROCEDURE — 80053 COMPREHEN METABOLIC PANEL: CPT | Performed by: FAMILY MEDICINE

## 2023-10-03 PROCEDURE — 85027 COMPLETE CBC AUTOMATED: CPT | Performed by: FAMILY MEDICINE

## 2023-10-03 PROCEDURE — 99396 PREV VISIT EST AGE 40-64: CPT | Performed by: FAMILY MEDICINE

## 2023-10-03 PROCEDURE — 86803 HEPATITIS C AB TEST: CPT | Performed by: FAMILY MEDICINE

## 2023-10-03 PROCEDURE — 99214 OFFICE O/P EST MOD 30 MIN: CPT | Mod: 25 | Performed by: FAMILY MEDICINE

## 2023-10-03 PROCEDURE — 36415 COLL VENOUS BLD VENIPUNCTURE: CPT | Performed by: FAMILY MEDICINE

## 2023-10-03 PROCEDURE — 80061 LIPID PANEL: CPT | Performed by: FAMILY MEDICINE

## 2023-10-03 PROCEDURE — G0296 VISIT TO DETERM LDCT ELIG: HCPCS | Performed by: FAMILY MEDICINE

## 2023-10-03 RX ORDER — ALBUTEROL SULFATE 90 UG/1
2 AEROSOL, METERED RESPIRATORY (INHALATION) EVERY 6 HOURS PRN
Qty: 18 G | Refills: 0 | Status: SHIPPED | OUTPATIENT
Start: 2023-10-03 | End: 2023-12-15

## 2023-10-03 ASSESSMENT — ENCOUNTER SYMPTOMS
ABDOMINAL PAIN: 0
HEMATURIA: 0
HEMATOCHEZIA: 0
CONSTIPATION: 0
CHILLS: 0
COUGH: 0

## 2023-10-03 ASSESSMENT — PAIN SCALES - GENERAL: PAINLEVEL: NO PAIN (0)

## 2023-10-03 NOTE — PATIENT INSTRUCTIONS
Lung Cancer Screening   Frequently Asked Questions  If you are at high-risk for lung cancer, getting screened with low-dose computed tomography (LDCT) every year can help save your life. This handout offers answers to some of the most common questions about lung cancer screening. If you have other questions, please call [phone] ([phone]).     What is it?  Lung cancer screening uses special X-ray technology to create an image of your lung tissue. The exam is quick and easy and takes less than 10 seconds. We don t give you any medicine or use any needles. You can eat before and after the exam. You don t need to change your clothes as long as the clothing on your chest doesn t contain metal. But, you do need to be able to hold your breath for at least 6 seconds during the exam.    What is the goal of lung cancer screening?  The goal of lung cancer screening is to save lives. Many times, lung cancer is not found until a person starts having physical symptoms. Lung cancer screening can help detect lung cancer in the earliest stages when it may be easier to treat.    Who should be screened for lung cancer?  We suggest lung cancer screening for anyone who is at high-risk for lung cancer. You are in the high-risk group if you:      are between the ages of 55 and 79, and    have smoked at least 1 pack of cigarettes a day for 20 or more years, and    still smoke or have quit within the past 15 years.    However, if you have a new cough or shortness of breath, you should talk to your doctor before being screened.    Why does it matter if I have symptoms?  Certain symptoms can be a sign that you have a condition in your lungs that should be checked and treated by your doctor. These symptoms include fever, chest pain, a new or changing cough, shortness of breath that you have never felt before, coughing up blood or unexplained weight loss. Having any of these symptoms can greatly affect the results of lung  cancer screening.       Should all smokers get an LDCT lung cancer screening exam?  It depends. Lung cancer screening is for a very specific group of men and women who have a history of heavy smoking over a long period of time (see  Who should be screened for lung cancer  above).  I am in the high-risk group, but have been diagnosed with cancer in the past. Is LDCT lung cancer screening right for me?  In some cases, you should not have LDCT lung screening, such as when your doctor is already following your cancer with CT scan studies. Your doctor will help you decide if LDCT lung screening is right for you.  Do I need to have a screening exam every year?  Yes. If you are in the high-risk group described earlier, you should get an LDCT lung cancer screening exam every year until you are 79, or are no longer willing or able to undergo screening and possible procedures to diagnose and treat lung cancer.  How effective is LDCT at preventing death from lung cancer?  Studies have shown that LDCT lung cancer screening can lower the risk of death from lung cancer by 20 percent in people who are at high-risk.  What are the risks?  There are some risks and limitations of LDCT lung cancer screening. We want to make sure you understand the risks and benefits, so please let us know if you have any questions. Your doctor may want to talk with you more about these risks.    Radiation exposure: As with any exam that uses radiation, there is a very small increased risk of cancer. The amount of radiation in LDCT is small--about the same amount a person would get from a mammogram. Your doctor orders the exam when he or she feels the potential benefits outweigh the risks.    False negatives: No test is perfect, including LDCT. It is possible that you may have a medical condition, including lung cancer, that is not found during your exam. This is called a false negative result.    False positives and more testing: LDCT very often finds  something in the lung that could be cancer, but in fact is not. This is called a false positive result. False positive tests often cause anxiety. To make sure these findings are not cancer, you may need to have more tests. These tests will be done only if you give us permission. Sometimes patients need a treatment that can have side effects, such as a biopsy. For more information on false positives, see  What can I expect from the results?     Findings not related to lung cancer: Your LDCT exam also takes pictures of areas of your body next to your lungs. In a very small number of cases, the CT scan will show an abnormal finding in one of these areas, such as your kidneys, adrenal glands, liver or thyroid. This finding may not be serious, but you may need more tests. Your doctor can help you decide what other tests you may need, if any.  What can I expect from the results?  About 1 out of 4 LDCT exams will find something that may need more tests. Most of the time, these findings are lung nodules. Lung nodules are very small collections of tissue in the lung. These nodules are very common, and the vast majority--more than 97 percent--are not cancer (benign). Most are normal lymph nodes or small areas of scarring from past infections.  But, if a small lung nodule is found to be cancer, the cancer can be cured more than 90 percent of the time. To know if the nodule is cancer, we may need to get more images before your next yearly screening exam. If the nodule has suspicious features (for example, it is large, has an odd shape or grows over time), we will refer you to a specialist for further testing.  Will my doctor also get the results?  Yes. Your doctor will get a copy of your results.  Is it okay to keep smoking now that there s a cancer screening exam?  No. Tobacco is one of the strongest cancer-causing agents. It causes not only lung cancer, but other cancers and cardiovascular (heart) diseases as well. The damage  caused by smoking builds over time. This means that the longer you smoke, the higher your risk of disease. While it is never too late to quit, the sooner you quit, the better.  Where can I find help to quit smoking?  The best way to prevent lung cancer is to stop smoking. If you have already quit smoking, congratulations and keep it up! For help on quitting smoking, please call [employer] [facility] at 0-951-QUITNOW (1-267.921.8341) or the American Cancer Society at 1-234.838.3068 to find local resources near you.  One-on-one health coaching:  If you d prefer to work individually with a health care provider on tobacco cessation, we offer:      Medication Therapy Management:  Our specially trained pharmacists work closely with you and your doctor to help you quit smoking.  Call [phone] or [phone] (toll free).    Preventive Health Recommendations  Male Ages 50 - 64    Yearly exam:             See your health care provider every year in order to  o   Review health changes.   o   Discuss preventive care.    o   Review your medicines if your doctor has prescribed any.   Have a cholesterol test every 5 years, or more frequently if you are at risk for high cholesterol/heart disease.   Have a diabetes test (fasting glucose) every three years. If you are at risk for diabetes, you should have this test more often.   Have a colonoscopy at age 45, or have a yearly FIT test (stool test). These exams will check for colon cancer.    Talk with your health care provider about whether or not a prostate cancer screening test (PSA) is right for you.  You should be tested each year for STDs (sexually transmitted diseases), if you re at risk.     Shots: Get a flu shot each year. Get a tetanus shot every 10 years.     Nutrition:  Eat at least 5 servings of fruits and vegetables daily.   Eat whole-grain bread, whole-wheat pasta and brown rice instead of white grains and rice.   Get adequate Calcium and Vitamin D.     Lifestyle  Exercise  for at least 150 minutes a week (30 minutes a day, 5 days a week). This will help you control your weight and prevent disease.   Limit alcohol to one drink per day.   No smoking.   Wear sunscreen to prevent skin cancer.   See your dentist every six months for an exam and cleaning.   See your eye doctor every 1 to 2 years.

## 2023-10-03 NOTE — PROGRESS NOTES
SUBJECTIVE:   CC: Nader is an 62 year old who presents for preventative health visit.       10/3/2023     2:51 PM   Additional Questions   Roomed by Lara CULLEN   Accompanied by self     Six months lesion on the nose, scabs then comes back  Used to be in the sun a lot without sunblock  He is concerned for malignancy    Quit smoking in June 12  Smoked for total of around 20 years, 1 pack a day  chest discomfort that got slightly better with quitting smoking  Was in [place] recently, however, this was occurring prior to going to [place]  No chest pain with exertion  sob, difficulty taking deep breaths  No known triggers  Is quite active, doing multiple construction projects  No fevers, chills, diaphoresis  Patient states he had a stress test in the past that was unremarkable - they told him he had anxiety  Alcohol improves the symptoms    Healthy Habits:     Getting at least 3 servings of Calcium per day:  NO    Bi-annual eye exam:  Yes    Dental care twice a year:  NO    Sleep apnea or symptoms of sleep apnea:  None and Daytime drowsiness    Diet:  Regular (no restrictions)    Frequency of exercise:  None    Taking medications regularly:  Yes    Medication side effects:  None    Additional concerns today:  Yes      Today's PHQ-2 Score:       10/3/2023     2:48 PM   PHQ-2 ( 1999 [employer])   Q1: Little interest or pleasure in doing things 0   Q2: Feeling down, depressed or hopeless 1   PHQ-2 Score 1   Q1: Little interest or pleasure in doing things Not at all   Q2: Feeling down, depressed or hopeless Several days   PHQ-2 Score 1             Have you ever done Advance Care Planning? (For example, a Health Directive, POLST, or a discussion with a medical provider or your loved ones about your wishes): No, advance care planning information given to patient to review.  Patient declined advance care planning discussion at this time.    Social History     Tobacco Use    Smoking status: Former     Packs/day: 1.00     Years: 20.00      Pack years: 20.00     Types: Cigarettes     Quit date: 2023     Years since quittin.3    Smokeless tobacco: Never   Substance Use Topics    Alcohol use: Yes     Alcohol/week: 0.0 standard drinks of alcohol     Comment: 12 pack in a week             10/3/2023     2:47 PM   Alcohol Use   Prescreen: >3 drinks/day or >7 drinks/week? No          No data to display                Last PSA: No results found for: PSA    Reviewed orders with patient. Reviewed health maintenance and updated orders accordingly - Yes  BP Readings from Last 3 Encounters:   10/03/23 108/60   21 120/70   21 (!) 167/96    Wt Readings from Last 3 Encounters:   10/03/23 88.9 kg (196 lb)   21 84.4 kg (186 lb)   21 83.9 kg (185 lb)                  Patient Active Problem List   Diagnosis    Hyperlipidemia LDL goal <160    Allergic conjunctivitis    Non morbid obesity due to excess calories    JO (obstructive sleep apnea)     Past Surgical History:   Procedure Laterality Date    COLONOSCOPY      COLONOSCOPY N/A 2016    Procedure: COLONOSCOPY;  Surgeon: Sean Lopez MD;  Location: [facility]    SURGICAL HISTORY OF -       ORIF left leg    SURGICAL HISTORY OF -       low back nerve block?       Social History     Tobacco Use    Smoking status: Former     Packs/day: 1.00     Years: 20.00     Pack years: 20.00     Types: Cigarettes     Quit date: 2023     Years since quittin.3    Smokeless tobacco: Never   Substance Use Topics    Alcohol use: Yes     Alcohol/week: 0.0 standard drinks of alcohol     Comment: 12 pack in a week     Family History   Problem Relation Age of Onset    Cancer - colorectal No family hx of     Prostate Cancer No family hx of     C.A.D. No family hx of          Current Outpatient Medications   Medication Sig Dispense Refill    albuterol (PROAIR HFA/PROVENTIL HFA/VENTOLIN HFA) 108 (90 Base) MCG/ACT inhaler Inhale 2 puffs into the lungs every 6 hours as needed for shortness of breath,  "wheezing or cough 18 g 0    IBUPROFEN 200 MG OR TABS 600 mg by mouth at bedtime 120 0       Reviewed and updated as needed this visit by clinical staff   Tobacco  Allergies  Meds              Reviewed and updated as needed this visit by Provider                 Past Medical History:   Diagnosis Date    Calculus of kidney     Headache(784.0)     likely stress    Keratoconus     Uses contact lenses       Past Surgical History:   Procedure Laterality Date    COLONOSCOPY      COLONOSCOPY N/A 12/8/2016    Procedure: COLONOSCOPY;  Surgeon: Sean Lopez MD;  Location: [facility]    SURGICAL HISTORY OF -       ORIF left leg    SURGICAL HISTORY OF -       low back nerve block?       Review of Systems   Constitutional:  Negative for chills.   HENT:  Negative for congestion.    Respiratory:  Negative for cough.    Cardiovascular:  Negative for chest pain.   Gastrointestinal:  Negative for abdominal pain, constipation and hematochezia.   Genitourinary:  Negative for hematuria.     CONSTITUTIONAL: NEGATIVE for fever, chills, change in weight  INTEGUMENTARY/SKIN: lesion on nose bridge  EYES: NEGATIVE for vision changes or irritation, has contact lens  ENT: Negative for congestion, hearing loss  RESP:  SOB  CV: NEGATIVE for chest pain, palpitations or peripheral edema  GI: NEGATIVE for nausea, abdominal pain, heartburn, or change in bowel habits   male: negative for dysuria, hematuria, decreased urinary stream, erectile dysfunction, urethral discharge  MUSCULOSKELETAL: chronic back pain  NEURO: NEGATIVE for weakness, dizziness or paresthesias  PSYCHIATRIC: NEGATIVE for changes in mood or affect    OBJECTIVE:   /60   Pulse 72   Temp 98  F (36.7  C) (Tympanic)   Resp 14   Ht 1.75 m (5' 8.9\")   Wt 88.9 kg (196 lb)   SpO2 97%   BMI 29.03 kg/m      Physical Exam  Vitals reviewed.   Constitutional:       General: He is not in acute distress.  HENT:      Right Ear: Tympanic membrane normal.      Left Ear: Tympanic membrane " normal.      Nose:      Comments: Round Lesion on nose bridge that is around 0.4cm, no ulceration  Eyes:      General: No scleral icterus.     Extraocular Movements: Extraocular movements intact.      Conjunctiva/sclera: Conjunctivae normal.      Pupils: Pupils are equal, round, and reactive to light.   Cardiovascular:      Rate and Rhythm: Normal rate and regular rhythm.   Pulmonary:      Effort: Pulmonary effort is normal. No respiratory distress.      Breath sounds: Normal breath sounds. No stridor. No wheezing, rhonchi or rales.   Chest:      Chest wall: No tenderness.   Abdominal:      Palpations: Abdomen is soft.      Tenderness: There is no abdominal tenderness.   Musculoskeletal:      Right lower leg: No edema.      Left lower leg: No edema.   Neurological:      Mental Status: He is alert and oriented to person, place, and time.               ASSESSMENT/PLAN:     Carmelo was seen today for physical.    Diagnoses and all orders for this visit:    Routine general medical examination at a health care facility  Patient declines all vaccines today    Screening for HIV (human immunodeficiency virus)    Need for hepatitis C screening test  -     Hepatitis C Screen Reflex to HCV RNA Quant and Genotype; Future    Hyperlipidemia LDL goal <160  -     Lipid panel reflex to direct LDL Non-fasting; Future    Screen for colon cancer  -     Colonoscopy Screening  Referral; Future    History of polyps.    Discomfort in chest  SOB (shortness of breath)  History of tobacco use  -     General PFT Lab (Please always keep checked); Future  -     albuterol (PROAIR HFA/PROVENTIL HFA/VENTOLIN HFA) 108 (90 Base) MCG/ACT inhaler; Inhale 2 puffs into the lungs every 6 hours as needed for shortness of breath, wheezing or cough  -     CBC with platelets; Future  -     Comprehensive metabolic panel (BMP + Alb, Alk Phos, ALT, AST, Total. Bili, TP); Future  -     CT Chest Lung Cancer Screen Low Dose Without; Future    Obtain PFT,  CT chest. Trial albuterol inhaler and see if symptoms improve. Labs as above. Could potentially be anxiety induced. Patient would like to defer starting medication management for anxiety. Acute coronary syndrome unlikely. PE unlikely.    Non-healing skin lesion of nose  -     Adult Dermatology Referral; Future    Referral to dermatology for further evaluation. Non healing round skin lesion on the bridge of the nose for 6 months.    Keratoconus of both eyes  Follows with optometry          COUNSELING:   Reviewed preventive health counseling, as reflected in patient instructions       Regular exercise       Healthy diet/nutrition       Vision screening       Hearing screening       Alcohol Use        Colorectal cancer screening       Prostate cancer screening       Consider lung cancer screening for ages 55-80 years (77 for Medicare) and 20 pack-year smoking history         He reports that he quit smoking about 3 months ago. His smoking use included cigarettes. He has a 20.00 pack-year smoking history. He has never used smokeless tobacco.            Lizzie Austin MD  [facility] Cancer Screening Shared Decision Making Visit     Randall J Jennissen, a 62 year old male, is eligible for lung cancer screening    History   Smoking Status    Former    Packs/day: 1.00    Years: 20.00    Types: Cigarettes    Quit date: 6/12/2023   Smokeless Tobacco    Never       I have discussed with patient the risks and benefits of screening for lung cancer with low-dose CT.     The risks include:    radiation exposure: one low dose chest CT has as much ionizing radiation as about 15 chest x-rays, or 6 months of background radiation living in [place]      false positives: most findings/nodules are NOT cancer, but some might still require additional diagnostic evaluation, including biopsy    over-diagnosis: some slow growing cancers that might never have been clinically significant will be detected and  treated unnecessarily     The benefit of early detection of lung cancer is contingent upon adherence to annual screening or more frequent follow up if indicated.     Furthermore, to benefit from screening, Carmelo must be willing and able to undergo diagnostic procedures, if indicated. Although no specific guide is available for determining severity of comorbidities, it is reasonable to withhold screening in patients who have greater mortality risk from other diseases.     We did discuss that the best way to prevent lung cancer is to not smoke.    Some patients may value a numeric estimation of lung cancer risk when evaluating if lung cancer screening is right for them, here is one calculator:    ShouldIScreen

## 2023-10-04 LAB — HCV AB SERPL QL IA: NONREACTIVE

## 2023-10-06 ENCOUNTER — TELEPHONE (OUTPATIENT)
Dept: FAMILY MEDICINE | Facility: CLINIC | Age: 63
End: 2023-10-06
Payer: COMMERCIAL

## 2023-10-06 DIAGNOSIS — L98.9 NON-HEALING SKIN LESION OF NOSE: Primary | ICD-10-CM

## 2023-11-07 ENCOUNTER — HOSPITAL ENCOUNTER (OUTPATIENT)
Dept: CT IMAGING | Facility: CLINIC | Age: 63
Discharge: HOME | End: 2023-11-07
Attending: FAMILY MEDICINE | Admitting: FAMILY MEDICINE
Payer: MEDICARE

## 2023-11-07 DIAGNOSIS — Z87.891 HISTORY OF TOBACCO USE: ICD-10-CM

## 2023-11-07 PROCEDURE — 71271 CT THORAX LUNG CANCER SCR C-: CPT

## 2023-12-15 ENCOUNTER — ANCILLARY PROCEDURE (OUTPATIENT)
Dept: GENERAL RADIOLOGY | Facility: CLINIC | Age: 63
End: 2023-12-15
Attending: FAMILY MEDICINE
Payer: COMMERCIAL

## 2023-12-15 ENCOUNTER — OFFICE VISIT (OUTPATIENT)
Dept: FAMILY MEDICINE | Facility: CLINIC | Age: 63
End: 2023-12-15
Payer: COMMERCIAL

## 2023-12-15 VITALS
HEIGHT: 70 IN | BODY MASS INDEX: 28.35 KG/M2 | DIASTOLIC BLOOD PRESSURE: 80 MMHG | TEMPERATURE: 97.4 F | HEART RATE: 71 BPM | OXYGEN SATURATION: 96 % | WEIGHT: 198 LBS | SYSTOLIC BLOOD PRESSURE: 130 MMHG | RESPIRATION RATE: 20 BRPM

## 2023-12-15 DIAGNOSIS — M25.511 CHRONIC PAIN OF BOTH SHOULDERS: ICD-10-CM

## 2023-12-15 DIAGNOSIS — G89.29 CHRONIC PAIN OF BOTH SHOULDERS: ICD-10-CM

## 2023-12-15 DIAGNOSIS — M25.512 CHRONIC PAIN OF BOTH SHOULDERS: Primary | ICD-10-CM

## 2023-12-15 DIAGNOSIS — M25.512 CHRONIC PAIN OF BOTH SHOULDERS: ICD-10-CM

## 2023-12-15 DIAGNOSIS — Z12.11 SCREEN FOR COLON CANCER: ICD-10-CM

## 2023-12-15 DIAGNOSIS — G89.29 CHRONIC PAIN OF BOTH SHOULDERS: Primary | ICD-10-CM

## 2023-12-15 DIAGNOSIS — M79.2 NEUROPATHIC PAIN: ICD-10-CM

## 2023-12-15 DIAGNOSIS — M25.511 CHRONIC PAIN OF BOTH SHOULDERS: Primary | ICD-10-CM

## 2023-12-15 PROCEDURE — 73030 X-RAY EXAM OF SHOULDER: CPT | Mod: TC | Performed by: RADIOLOGY

## 2023-12-15 PROCEDURE — 99213 OFFICE O/P EST LOW 20 MIN: CPT | Performed by: FAMILY MEDICINE

## 2023-12-15 RX ORDER — GABAPENTIN 300 MG/1
300 CAPSULE ORAL AT BEDTIME
Qty: 60 CAPSULE | Refills: 0 | Status: SHIPPED | OUTPATIENT
Start: 2023-12-15 | End: 2024-06-11

## 2023-12-15 RX ORDER — RESPIRATORY SYNCYTIAL VIRUS VACCINE 120MCG/0.5
0.5 KIT INTRAMUSCULAR ONCE
Qty: 1 EACH | Refills: 0 | Status: CANCELLED | OUTPATIENT
Start: 2023-12-15 | End: 2023-12-15

## 2023-12-15 ASSESSMENT — ANXIETY QUESTIONNAIRES
7. FEELING AFRAID AS IF SOMETHING AWFUL MIGHT HAPPEN: SEVERAL DAYS
6. BECOMING EASILY ANNOYED OR IRRITABLE: SEVERAL DAYS
2. NOT BEING ABLE TO STOP OR CONTROL WORRYING: NOT AT ALL
1. FEELING NERVOUS, ANXIOUS, OR ON EDGE: NOT AT ALL
5. BEING SO RESTLESS THAT IT IS HARD TO SIT STILL: NOT AT ALL
IF YOU CHECKED OFF ANY PROBLEMS ON THIS QUESTIONNAIRE, HOW DIFFICULT HAVE THESE PROBLEMS MADE IT FOR YOU TO DO YOUR WORK, TAKE CARE OF THINGS AT HOME, OR GET ALONG WITH OTHER PEOPLE: SOMEWHAT DIFFICULT
GAD7 TOTAL SCORE: 2
3. WORRYING TOO MUCH ABOUT DIFFERENT THINGS: NOT AT ALL
GAD7 TOTAL SCORE: 2

## 2023-12-15 ASSESSMENT — PATIENT HEALTH QUESTIONNAIRE - PHQ9
SUM OF ALL RESPONSES TO PHQ QUESTIONS 1-9: 2
5. POOR APPETITE OR OVEREATING: NOT AT ALL

## 2023-12-15 ASSESSMENT — PAIN SCALES - GENERAL: PAINLEVEL: NO PAIN (1)

## 2023-12-15 ASSESSMENT — ENCOUNTER SYMPTOMS
NERVOUS/ANXIOUS: 1
ARTHRALGIAS: 1

## 2023-12-15 NOTE — PROGRESS NOTES
Assessment & Plan     Nader was seen today for musculoskeletal problem and anxiety.    Diagnoses and all orders for this visit:    Chronic pain of both shoulders  -     XR Shoulder Right G/E 3 Views; Future  -     XR Shoulder Left G/E 3 Views; Future  -     Physical Therapy Referral; Future    Screen for colon cancer  -     Colonoscopy Screening  Referral; Future    Neuropathic pain  -     gabapentin (NEURONTIN) 300 MG capsule; Take 1 capsule (300 mg) by mouth at bedtime for 60 days  -     Physical Therapy Referral; Future      Chronic bilateral shoulder pain intermittently worsens and improves. History of physical use. Currently retired, still doing a lot of active work around the house. Recently, he also has pain radiating from the back, numbness and tingling of bilateral upper extremities. Hand ache. Currently his symptoms has improved.  He does have Degenerative changes are noted through the spine based on CT chest 11/07/2023. Over the counter medication has not helped much. Imaging as above. After discussion we will trial gabapentin starting at 300mg at bedtime - will be beneficial for anxiety as well - titrate as indicated. Discussed side effects of drowsiness.    Of note we discussed ascvd risk of 12.9%, after discussion he would lke to try lifestyle changes prior to medication.                   Lizzie Austin MD  [facility]    Subjective   Nader is a 63 year old, presenting for the following health issues:  Musculoskeletal Problem and Anxiety (Has stabbing chest pain multiple times)        12/15/2023     7:54 AM   Additional Questions   Roomed by Cassy       History of Present Illness       Reason for visit:  Shoulder pain ,arm and hand pain  Symptom onset:  More than a month  Symptom intensity:  Moderate  Symptom progression:  Staying the same  Had these symptoms before:  Yes  Has tried/received treatment for these symptoms:  Yes  Previous treatment was successful:   "No  What makes it worse:  Cold  What makes it better:  Warm    He eats 0-1 servings of fruits and vegetables daily.He consumes 1 sweetened beverage(s) daily.He exercises with enough effort to increase his heart rate 10 to 19 minutes per day.  He exercises with enough effort to increase his heart rate 3 or less days per week.          Abnormal Mood Symptoms  Onset/Duration: 20 -30 years  Description: chest pains  Depression (if yes, do PHQ-9): No  Anxiety (if yes, do FABIOLA-7): YES  Accompanying Signs & Symptoms:  Still participating in activities that you used to enjoy: No  Fatigue: YES  Irritability: YES  Difficulty concentrating: No  Changes in appetite: No  Problems with sleep: YES  Heart racing/beating fast: No  Abnormally elevated, expansive, or irritable mood: YES  Persistently increased activity or energy: No  Thoughts of hurting yourself or others: No  History:  Recent stress or major life event: FCI  Prior depression or anxiety: anxiety  Family history of depression or anxiety: No  Alcohol/drug use: YES- alcohol daily  Difficulty sleeping: YES- arm pain  Precipitating or alleviating factors: None  Therapies tried and outcome: none      12/15/2023     8:34 AM   PHQ   PHQ-9 Total Score 2   Q9: Thoughts of better off dead/self-harm past 2 weeks Not at all         12/15/2023     8:34 AM   FABIOLA-7 SCORE   Total Score 2           Review of Systems   Musculoskeletal:  Positive for arthralgias.   Psychiatric/Behavioral:  The patient is nervous/anxious.             Objective    /80 (BP Location: Right arm)   Pulse 71   Temp 97.4  F (36.3  C) (Tympanic)   Resp 20   Ht 1.778 m (5' 10\")   Wt 89.8 kg (198 lb)   SpO2 96%   BMI 28.41 kg/m    Body mass index is 28.41 kg/m .  Physical Exam  Vitals and nursing note reviewed.   Cardiovascular:      Rate and Rhythm: Normal rate.   Pulmonary:      Effort: Pulmonary effort is normal.   Musculoskeletal:         General: Normal range of motion.   Neurological:      " Mental Status: He is alert and oriented to person, place, and time.   Psychiatric:         Mood and Affect: Mood normal.         Behavior: Behavior normal.         Thought Content: Thought content normal.         Judgment: Judgment normal.

## 2024-01-06 ENCOUNTER — MYC MEDICAL ADVICE (OUTPATIENT)
Dept: FAMILY MEDICINE | Facility: CLINIC | Age: 64
End: 2024-01-06
Payer: COMMERCIAL

## 2024-01-06 DIAGNOSIS — M25.50 MULTIPLE JOINT PAIN: ICD-10-CM
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
## 2024-01-09 ENCOUNTER — THERAPY VISIT (OUTPATIENT)
Dept: PHYSICAL THERAPY | Facility: CLINIC | Age: 64
End: 2024-01-09
Attending: FAMILY MEDICINE
Payer: COMMERCIAL

## 2024-01-09 DIAGNOSIS — M25.511 CHRONIC PAIN OF BOTH SHOULDERS: ICD-10-CM

## 2024-01-09 DIAGNOSIS — M25.512 CHRONIC PAIN OF BOTH SHOULDERS: ICD-10-CM

## 2024-01-09 DIAGNOSIS — G89.29 CHRONIC PAIN OF BOTH SHOULDERS: ICD-10-CM

## 2024-01-09 DIAGNOSIS — M79.2 NEUROPATHIC PAIN: ICD-10-CM

## 2024-01-09 PROCEDURE — 97110 THERAPEUTIC EXERCISES: CPT | Mod: GP | Performed by: PHYSICAL MEDICINE & REHABILITATION

## 2024-01-09 PROCEDURE — 97140 MANUAL THERAPY 1/> REGIONS: CPT | Mod: GP | Performed by: PHYSICAL MEDICINE & REHABILITATION

## 2024-01-09 PROCEDURE — 97161 PT EVAL LOW COMPLEX 20 MIN: CPT | Mod: GP | Performed by: PHYSICAL MEDICINE & REHABILITATION

## 2024-01-09 NOTE — PROGRESS NOTES
"PHYSICAL THERAPY EVALUATION  Type of Visit: Evaluation    See electronic medical record for Abuse and Falls Screening details.    Subjective   Pt arrived to PT today for B shoulder pain that has been ongoing for decades. Shared if he doesn't use shoulders it gets better. But notes over time pain has progressively been getting worse. 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 Notes sleeping is challenging. Shared radiation of sx into B hands. Reports numbness/tinging into first 3 digits more so, but does share entire hand can go numb. Notes numbness worsens with cold weather. Shared he had a tick bite last year that wasn't healing, has sought a second medical provider who is looking into this further, but notes he did feel sx worsened after tick bite.   Date of onset: 12/15/23 (date of order, chronic condition)   Relevant medical history per pt report:   implanted device (dental), pain at night or rest, smoking (quit), vision problems  Dates & types of surgery per pt report:  surgery on broken lower leg 1978, lower back 1990s    Prior diagnostic imaging/testing results:   xray-see epic    Prior Level of Function  Transfers: Independent  Ambulation: Independent  ADL: Independent    Living Environment  Social support:   with significant other or spouse  Type of home:   2 story house  Stairs to enter the home:   2 DESTINY  Ramp:   no  Stairs inside the home:   2 sets of stairs in home with handrail    Employment:   retired nelson   Hobbies/Interests:  farming, tractor repair and show, hunting    Patient goals for therapy:  \"work with less pain\"     Objective   SHOULDER EVALUATION  INTEGUMENTARY (edema, incisions): no palpable edema B  POSTURE: rounded shoulders  GAIT:   Weightbearing Status: WBAT  Assistive Device(s): None  Gait Deviations: WNL  AROM: L shoulder flexion: 153*, R shoulder flexion: 147*, R shoulder abd: 145* (slight increase in pain with eccentrically lowering), L shoulder abd: 140* (slight increase in pain with " eccentrically lowering), R shoulder ER: T3/4, L shoulder ER: T1/2 (discomfort), IR B: T11/12 (notes tightness B)  R SHOULDER PROM: flexion: 165*, abd: 165*, ER at 90*: 85*, IR at 90*: 70*. Notes pain at end range flexion and abd  L SHOULDER PROM: flexion: 165*, abd: 150*, IR at 90*: 50*, ER at 90: 90*. Notes pain at end range flexion and abd  STRENGTH: 5/5 global UE strength with slight discomfort in flex and abd, except flexion 4/5 B   FLEXIBILITY: pec minor B  SPECIAL TESTS: obriens test pos on L, neers pos on R, neg lift off sign B  PALPATION: notes tenderness over first rib B  JOINT MOBILITY: hypomobility of B SCJ, ACJ, GHJ and first rib  CERVICAL SCREEN: hypomobility throughout cervical spine     Assessment & Plan   CLINICAL IMPRESSIONS  Medical Diagnosis: Neuropathic pain (M79.2), Chronic pain of both shoulders (M25.511, G89.29, M25.512)   Treatment Diagnosis: chronic B shoulder pain   Impression/Assessment: Patient is a 63 year old male with chronic B shoulder pain complaints.  The following significant findings have been identified: Pain, Decreased ROM/flexibility, Decreased joint mobility, Decreased strength, Impaired sensation, Impaired muscle performance, Decreased activity tolerance, and Impaired posture. These impairments interfere with their ability to perform self care tasks, recreational activities, and household chores as compared to previous level of function.     Clinical Decision Making (Complexity):  Clinical Presentation: Stable/Uncomplicated  Clinical Presentation Rationale: based on medical and personal factors listed in PT evaluation  Clinical Decision Making (Complexity): Low complexity    PLAN OF CARE  Treatment Interventions:  Modalities: Cryotherapy, E-stim, Hot Pack, Mechanical Traction, Ultrasound, TENS  Interventions: Manual Therapy, Neuromuscular Re-education, Therapeutic Activity, Therapeutic Exercise, Self-Care/Home Management    Long Term Goals   PT Goal 1  Goal Identifier:  1  Goal Description: Pt will be able to flex B shoulder 170* in order to decrease difficulty with reaching overhead  Target Date: 02/06/24  PT Goal 2  Goal Identifier: 2  Goal Description: Pt will be able to amb B shoulders 170* in order to decrease difficulty with reaching overhead  Target Date: 02/20/24  PT Goal 3  Goal Identifier: 3  Goal Description: Pt will be independent with HEP in order to self manage symptoms  Target Date: 03/05/24  PT Goal 4  Goal Identifier: 4  Goal Description: Pt will be able to demonstrate 5/5 B shoulder strength without pain in order to work around the house with less pain  Target Date: 03/05/24    Frequency of Treatment: 1x/week  Duration of Treatment: 8 weeks    Recommended Referrals to Other Professionals:  none  Education Assessment:   Learner/Method: Patient;Listening;Reading;Demonstration;Pictures/Video  Education Comments: pt demonstrated and verbalized good understanding    Risks and benefits of evaluation/treatment have been explained.   Patient/Family/caregiver agrees with Plan of Care.     Evaluation Time:  PT Eval, Low Complexity Minutes (79917): 25   Present: Not applicable     Signing Clinician: Cary Haas, PT      [facility]                                                                                   OUTPATIENT PHYSICAL THERAPY    PLAN OF TREATMENT FOR OUTPATIENT REHABILITATION   Patient's Last Name, First Name, M.I. Jennissen,Randall J YOB: 1960   Provider's Name   [facility]   Medical Record No.  [id number]     Onset Date: 12/15/23 (date of order, chronic condition)  Start of Care Date: 01/09/24     Medical Diagnosis:  Neuropathic pain (M79.2), Chronic pain of both shoulders (M25.511, G89.29, M25.512)      PT Treatment Diagnosis:  chronic B shoulder pain Plan of Treatment  Frequency/Duration: 1x/week/ 8 weeks    Certification date from 01/09/24 to 03/05/24         See  note for plan of treatment details and functional goals     Cary Haas, PT                         I CERTIFY THE NEED FOR THESE SERVICES FURNISHED UNDER        THIS PLAN OF TREATMENT AND WHILE UNDER MY CARE     (Physician attestation of this document indicates review and certification of the therapy plan).              Referring Provider:  Lizzie Austin    Initial Assessment  See Epic Evaluation- Start of Care Date: 01/09/24

## 2024-01-13 ENCOUNTER — LAB (OUTPATIENT)
Dept: LAB | Facility: CLINIC | Age: 64
End: 2024-01-13
Payer: COMMERCIAL
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
## 2024-01-13 DIAGNOSIS — M25.50 MULTIPLE JOINT PAIN: ICD-10-CM

## 2024-01-13 LAB
BASOPHILS # BLD AUTO: 0 10E3/UL (ref 0–0.2)
BASOPHILS NFR BLD AUTO: 0 %
EOSINOPHIL # BLD AUTO: 0.2 10E3/UL (ref 0–0.7)
EOSINOPHIL NFR BLD AUTO: 4 %
ERYTHROCYTE [DISTWIDTH] IN BLOOD BY AUTOMATED COUNT: 12.7 % (ref 10–15)
HCT VFR BLD AUTO: 40.7 % (ref 40–53)
HGB BLD-MCNC: 13.8 G/DL (ref 13.3–17.7)
IMM GRANULOCYTES # BLD: 0 10E3/UL
IMM GRANULOCYTES NFR BLD: 0 %
LYMPHOCYTES # BLD AUTO: 1.8 10E3/UL (ref 0.8–5.3)
LYMPHOCYTES NFR BLD AUTO: 31 %
MCH RBC QN AUTO: 31.4 PG (ref 26.5–33)
MCHC RBC AUTO-ENTMCNC: 33.9 G/DL (ref 31.5–36.5)
MCV RBC AUTO: 93 FL (ref 78–100)
MONOCYTES # BLD AUTO: 0.5 10E3/UL (ref 0–1.3)
MONOCYTES NFR BLD AUTO: 9 %
NEUTROPHILS # BLD AUTO: 3.2 10E3/UL (ref 1.6–8.3)
NEUTROPHILS NFR BLD AUTO: 55 %
PLATELET # BLD AUTO: 260 10E3/UL (ref 150–450)
RBC # BLD AUTO: 4.39 10E6/UL (ref 4.4–5.9)
WBC # BLD AUTO: 5.8 10E3/UL (ref 4–11)

## 2024-01-13 PROCEDURE — 86618 LYME DISEASE ANTIBODY: CPT

## 2024-01-13 PROCEDURE — 36415 COLL VENOUS BLD VENIPUNCTURE: CPT

## 2024-01-13 PROCEDURE — 85025 COMPLETE CBC W/AUTO DIFF WBC: CPT

## 2024-01-15 LAB
ANAPLASMA BLD MOD GIEMSA: NEGATIVE
B BURGDOR IGG+IGM SER QL: 0.13
B MICROTI BLD SMEAR: NEGATIVE
EHRLICHIA SPEC QL MICRO: NEGATIVE

## 2024-01-16 ENCOUNTER — THERAPY VISIT (OUTPATIENT)
Dept: PHYSICAL THERAPY | Facility: CLINIC | Age: 64
End: 2024-01-16
Attending: FAMILY MEDICINE
Payer: COMMERCIAL

## 2024-01-16 DIAGNOSIS — M25.511 CHRONIC PAIN OF BOTH SHOULDERS: ICD-10-CM

## 2024-01-16 DIAGNOSIS — G89.29 CHRONIC PAIN OF BOTH SHOULDERS: ICD-10-CM

## 2024-01-16 DIAGNOSIS — M25.512 CHRONIC PAIN OF BOTH SHOULDERS: ICD-10-CM

## 2024-01-16 DIAGNOSIS — M79.2 NEUROPATHIC PAIN: Primary | ICD-10-CM

## 2024-01-16 PROCEDURE — 97110 THERAPEUTIC EXERCISES: CPT | Mod: GP | Performed by: PHYSICAL MEDICINE & REHABILITATION

## 2024-01-16 PROCEDURE — 97140 MANUAL THERAPY 1/> REGIONS: CPT | Mod: GP | Performed by: PHYSICAL MEDICINE & REHABILITATION

## 2024-01-23 ENCOUNTER — THERAPY VISIT (OUTPATIENT)
Dept: PHYSICAL THERAPY | Facility: CLINIC | Age: 64
End: 2024-01-23
Attending: FAMILY MEDICINE
Payer: COMMERCIAL

## 2024-01-23 DIAGNOSIS — G89.29 CHRONIC PAIN OF BOTH SHOULDERS: ICD-10-CM

## 2024-01-23 DIAGNOSIS — M25.512 CHRONIC PAIN OF BOTH SHOULDERS: ICD-10-CM

## 2024-01-23 DIAGNOSIS — M79.2 NEUROPATHIC PAIN: Primary | ICD-10-CM

## 2024-01-23 DIAGNOSIS — M25.511 CHRONIC PAIN OF BOTH SHOULDERS: ICD-10-CM

## 2024-01-23 PROCEDURE — 97140 MANUAL THERAPY 1/> REGIONS: CPT | Mod: GP | Performed by: PHYSICAL MEDICINE & REHABILITATION

## 2024-01-24 DIAGNOSIS — G89.29 CHRONIC PAIN OF BOTH SHOULDERS: Primary | ICD-10-CM

## 2024-01-24 DIAGNOSIS — M25.511 CHRONIC PAIN OF BOTH SHOULDERS: Primary | ICD-10-CM

## 2024-01-24 DIAGNOSIS — M25.512 CHRONIC PAIN OF BOTH SHOULDERS: Primary | ICD-10-CM

## 2024-02-05 ENCOUNTER — OFFICE VISIT (OUTPATIENT)
Dept: ORTHOPEDICS | Facility: CLINIC | Age: 64
End: 2024-02-05
Attending: FAMILY MEDICINE
Payer: COMMERCIAL

## 2024-02-05 VITALS
SYSTOLIC BLOOD PRESSURE: 128 MMHG | WEIGHT: 198 LBS | DIASTOLIC BLOOD PRESSURE: 80 MMHG | HEART RATE: 58 BPM | BODY MASS INDEX: 28.41 KG/M2

## 2024-02-05 DIAGNOSIS — M25.512 CHRONIC PAIN OF BOTH SHOULDERS: ICD-10-CM

## 2024-02-05 DIAGNOSIS — M54.12 CERVICAL RADICULOPATHY: Primary | ICD-10-CM

## 2024-02-05 DIAGNOSIS — G89.29 CHRONIC PAIN OF BOTH SHOULDERS: ICD-10-CM

## 2024-02-05 DIAGNOSIS — M25.511 CHRONIC PAIN OF BOTH SHOULDERS: ICD-10-CM

## 2024-02-05 PROCEDURE — 99204 OFFICE O/P NEW MOD 45 MIN: CPT | Performed by: FAMILY MEDICINE

## 2024-02-05 RX ORDER — CYCLOBENZAPRINE HCL 10 MG
10 TABLET ORAL
Qty: 30 TABLET | Refills: 0 | Status: SHIPPED | OUTPATIENT
Start: 2024-02-05 | End: 2024-06-10

## 2024-02-05 RX ORDER — METHYLPREDNISOLONE 4 MG
TABLET, DOSE PACK ORAL
Qty: 21 TABLET | Refills: 0 | Status: SHIPPED | OUTPATIENT
Start: 2024-02-05 | End: 2024-06-10

## 2024-02-05 NOTE — PROGRESS NOTES
ASSESSMENT & PLAN    Nader was seen today for pain and pain.    Diagnoses and all orders for this visit:    Cervical radiculopathy  -     methylPREDNISolone (MEDROL DOSEPAK) 4 MG tablet therapy pack; Follow Package Directions  -     cyclobenzaprine (FLEXERIL) 10 MG tablet; Take 1 tablet (10 mg) by mouth nightly as needed for muscle spasms    Chronic pain of both shoulders  -     Orthopedic  Referral  -     methylPREDNISolone (MEDROL DOSEPAK) 4 MG tablet therapy pack; Follow Package Directions  -     cyclobenzaprine (FLEXERIL) 10 MG tablet; Take 1 tablet (10 mg) by mouth nightly as needed for muscle spasms      This issue is acute on chronic and Worsening.     # Acute on Chronic Bilateral Shoulder Pain: Randall J Jennissen  was seen today for acute on chronic shoulder pain. Symptoms had been going on for years, worsening over the past year. On examination there are positive findings of pain with shoulder range of motion, no rotator cuff weakness. Imaging findings showed no shoulder arthritis. Likely cause of patient's condition due to irritated rotator cuff tendon. Other possible conditions contributing to symptoms include cervical radiculopathy.  Counseled patient on nature of condition and treatment options.  Given this plan as below, follow-up one month.      Image Findings: no shoulder arthritis  Treatment: Activities as tolerated, home exercises given today for shoulders and neck  Medications/Injections:  Medrol dosepack, flexeril, Topical Voltaren gel, defer for now  Follow-up: In one month if symptoms do not improve, sooner if worsening  Can consider repeat evaluation    Tucker Tucker MD  [facility] SP[facility]TS MEDIC[facility]    -----  Chief Complaint   Patient presents with    Left Shoulder - Pain    Right Shoulder - Pain       SUBJECTIVE  Randall J Jennissen is a/an 63 year old male who is seen in consultation at the request of  Lizzie Austin M.D. for evaluation of bilateral  shoulder pain. Reviewed PCP notes.    The patient is seen by themselves.  The patient is Right handed    Onset: Chronic, years(s) ago. Reports insidious onset without acute precipitating event. Worse in the last 6 months. Does state that he has general joint pain.   Location of Pain: all around the joint   Worsened by: overhead movements, sleeping   Better with: rest   Treatments tried: Chiropractor, 3 sessions of PT - did not help   Associated symptoms: weakness, achy   Can have pain going to wrists  Pain with carrying a sheet of plywood  Pain affecting sleep, feels he it is getting weaker.   Orthopedic/Surgical history: NO  Social History/Occupation: retired   Has some neck pain, some numbness/tingling in hands noticeable over the past few weeks   No family history pertinent to patient's problem today.     REVIEW OF SYSTEMS:  Review of Systems  Constitutional, HEENT, cardiovascular, pulmonary, gi and gu systems are negative, except as otherwise noted.    OBJECTIVE:  /80   Pulse 58   Wt 89.8 kg (198 lb)   BMI 28.41 kg/m     General: healthy, alert and in no distress  HEENT: no scleral icterus or conjunctival erythema  Skin: no suspicious lesions or rash. No jaundice.  CV: distal perfusion intact    Resp: normal respiratory effort without conversational dyspnea   Psych: normal mood and affect  Gait: normal steady gait with appropriate coordination and balance    Neuro: Normal light sensory exam of bilateral upper extremity      Ortho Exam   BILATERAL SHOULDER  Inspection:    no swelling, bruising, discoloration, or obvious deformity or asymmetry  Palpation:    Tender about the supraspinatus insertion. Remainder of bony and tendinous landmarks are nontender.  Active Range of Motion:  MIld pain with end abduction/FF    Abduction 1650, FF 1650, , IR L1.       Strength:    Scapular plane abduction 5/5,  ER 5/5, IR 5/5, biceps 5/5, triceps 5/5  Special Tests:    Positive: None    Negative: Neer's,  Brennan', and supraspinatus (empty can)    CERVICAL SPINE  Inspection:    normal cervical lordosis present, rounded shoulders, forward head posture  Palpation:    Nontender.  Range of Motion:     Flexion full    Extension full    Right side bend full    Left side bend full    Right rotation full    Left rotation full  Strength:    Full strength throughout all neck muscles  Special Tests:    Positive: None    Negative: Spurling's (bilateral)    RADIOLOGY:  I independently, visualized and reviewed these images with the patient  Bilateral shoulder x-rays no arthritis    IMPRESSION: Normal glenohumeral alignment. Mild degenerative changes  in the acromioclavicular joint. No fractures are evident.      TORI ALCANTARA MD     IMPRESSION: Normal glenohumeral alignment. Mild degenerative changes  in the acromioclavicular joint. No fractures are evident.      TORI ALCANTARA MD    Review of external notes as documented elsewhere in note  Review of the result(s) of each unique test - bilateral shoulder x-rays       Disclaimer: This note consists of symbols derived from keyboarding, dictation and/or voice recognition software. As a result, there may be errors in the script that have gone undetected. Please consider this when interpreting information found in this chart.

## 2024-02-05 NOTE — LETTER
2/5/2024         RE: Randall J Jennissen  [address] [facility] [address]        Dear Colleague,    Thank you for referring your patient, Randall J Jennissen, to the [facility] [place]. Please see a copy of my visit note below.    ASSESSMENT & PLAN    Nader was seen today for pain and pain.    Diagnoses and all orders for this visit:    Cervical radiculopathy  -     methylPREDNISolone (MEDROL DOSEPAK) 4 MG tablet therapy pack; Follow Package Directions  -     cyclobenzaprine (FLEXERIL) 10 MG tablet; Take 1 tablet (10 mg) by mouth nightly as needed for muscle spasms    Chronic pain of both shoulders  -     Orthopedic  Referral  -     methylPREDNISolone (MEDROL DOSEPAK) 4 MG tablet therapy pack; Follow Package Directions  -     cyclobenzaprine (FLEXERIL) 10 MG tablet; Take 1 tablet (10 mg) by mouth nightly as needed for muscle spasms      This issue is acute on chronic and Worsening.     # Acute on Chronic Bilateral Shoulder Pain: Randall J Jennissen  was seen today for acute on chronic shoulder pain. Symptoms had been going on for years, worsening over the past year. On examination there are positive findings of pain with shoulder range of motion, no rotator cuff weakness. Imaging findings showed no shoulder arthritis. Likely cause of patient's condition due to irritated rotator cuff tendon. Other possible conditions contributing to symptoms include cervical radiculopathy.  Counseled patient on nature of condition and treatment options.  Given this plan as below, follow-up one month.      Image Findings: no shoulder arthritis  Treatment: Activities as tolerated, home exercises given today for shoulders and neck  Medications/Injections:  Medrol dosepack, flexeril, Topical Voltaren gel, defer for now  Follow-up: In one month if symptoms do not improve, sooner if worsening  Can consider repeat evaluation    Tucker Tucker MD  [facility] SPORTS MEDICINE  [facility]    -----  Chief Complaint   Patient presents with     Left Shoulder - Pain     Right Shoulder - Pain       SUBJECTIVE  Randall J Jennissen is a/an 63 year old male who is seen in consultation at the request of  Lizzie Austin M.D. for evaluation of bilateral shoulder pain. Reviewed PCP notes.    The patient is seen by themselves.  The patient is Right handed    Onset: Chronic, years(s) ago. Reports insidious onset without acute precipitating event. Worse in the last 6 months. Does state that he has general joint pain.   Location of Pain: all around the joint   Worsened by: overhead movements, sleeping   Better with: rest   Treatments tried: Chiropractor, 3 sessions of PT - did not help   Associated symptoms: weakness, achy   Can have pain going to wrists  Pain with carrying a sheet of plywood  Pain affecting sleep, feels he it is getting weaker.   Orthopedic/Surgical history: NO  Social History/Occupation: retired   Has some neck pain, some numbness/tingling in hands noticeable over the past few weeks   No family history pertinent to patient's problem today.     REVIEW OF SYSTEMS:  Review of Systems  Constitutional, HEENT, cardiovascular, pulmonary, gi and gu systems are negative, except as otherwise noted.    OBJECTIVE:  /80   Pulse 58   Wt 89.8 kg (198 lb)   BMI 28.41 kg/m     General: healthy, alert and in no distress  HEENT: no scleral icterus or conjunctival erythema  Skin: no suspicious lesions or rash. No jaundice.  CV: distal perfusion intact    Resp: normal respiratory effort without conversational dyspnea   Psych: normal mood and affect  Gait: normal steady gait with appropriate coordination and balance    Neuro: Normal light sensory exam of bilateral upper extremity      Ortho Exam   BILATERAL SHOULDER  Inspection:    no swelling, bruising, discoloration, or obvious deformity or asymmetry  Palpation:    Tender about the supraspinatus insertion. Remainder of bony and tendinous  landmarks are nontender.  Active Range of Motion:  MIld pain with end abduction/FF    Abduction 1650, FF 1650, , IR L1.       Strength:    Scapular plane abduction 5/5,  ER 5/5, IR 5/5, biceps 5/5, triceps 5/5  Special Tests:    Positive: None    Negative: Neer's, Brennan', and supraspinatus (empty can)    CERVICAL SPINE  Inspection:    normal cervical lordosis present, rounded shoulders, forward head posture  Palpation:    Nontender.  Range of Motion:     Flexion full    Extension full    Right side bend full    Left side bend full    Right rotation full    Left rotation full  Strength:    Full strength throughout all neck muscles  Special Tests:    Positive: None    Negative: Spurling's (bilateral)    RADIOLOGY:  I independently, visualized and reviewed these images with the patient  Bilateral shoulder x-rays no arthritis    IMPRESSION: Normal glenohumeral alignment. Mild degenerative changes  in the acromioclavicular joint. No fractures are evident.      TORI ALCANTARA MD     IMPRESSION: Normal glenohumeral alignment. Mild degenerative changes  in the acromioclavicular joint. No fractures are evident.      TORI ALCANTARA MD    Review of external notes as documented elsewhere in note  Review of the result(s) of each unique test - bilateral shoulder x-rays       Disclaimer: This note consists of symbols derived from keyboarding, dictation and/or voice recognition software. As a result, there may be errors in the script that have gone undetected. Please consider this when interpreting information found in this chart.      Again, thank you for allowing me to participate in the care of your patient.        Sincerely,        Tucker Tucker MD

## 2024-02-05 NOTE — PATIENT INSTRUCTIONS
# Acute on Chronic Bilateral Shoulder Pain: Randall J Jennissen  was seen today for acute on chronic shoulder pain. Symptoms had been going on for years, worsening over the past year. On examination there are positive findings of pain with shoulder range of motion, no rotator cuff weakness. Imaging findings showed no shoulder arthritis. Likely cause of patient's condition due to irritated rotator cuff tendon. Other possible conditions contributing to symptoms include cervical radiculopathy.  Counseled patient on nature of condition and treatment options.  Given this plan as below, follow-up one month.      Image Findings: no shoulder arthritis  Treatment: Activities as tolerated, home exercises given today for shoulders and neck  Medications/Injections:  Medrol dosepack, flexeril, Topical Voltaren gel, defer for now  Follow-up: In one month if symptoms do not improve, sooner if worsening  Can consider repeat evaluation    Please call [phone]   Ask for my team if you have any questions or concerns    If you have not yet received the influenza vaccine but would like to get one, please call  [phone] or you can schedule via [employer] [place]    It was great seeing you again today!    Tucker Tucker MD, CAQSM

## 2024-02-16 ENCOUNTER — MYC MEDICAL ADVICE (OUTPATIENT)
Dept: FAMILY MEDICINE | Facility: CLINIC | Age: 64
End: 2024-02-16
Payer: COMMERCIAL

## 2024-02-16 DIAGNOSIS — M25.50 MULTIPLE JOINT PAIN: Primary | ICD-10-CM

## 2024-02-16 DIAGNOSIS — Z83.79 FAMILY HISTORY OF CELIAC DISEASE: ICD-10-CM

## 2024-02-27 ENCOUNTER — LAB (OUTPATIENT)
Dept: LAB | Facility: CLINIC | Age: 64
End: 2024-02-27
Payer: COMMERCIAL

## 2024-02-27 DIAGNOSIS — Z83.79 FAMILY HISTORY OF CELIAC DISEASE: ICD-10-CM

## 2024-02-27 DIAGNOSIS — M25.50 MULTIPLE JOINT PAIN: ICD-10-CM

## 2024-02-27 PROCEDURE — 82784 ASSAY IGA/IGD/IGG/IGM EACH: CPT

## 2024-02-27 PROCEDURE — 36415 COLL VENOUS BLD VENIPUNCTURE: CPT

## 2024-02-27 PROCEDURE — 86231 EMA EACH IG CLASS: CPT | Mod: 90

## 2024-02-27 PROCEDURE — 99000 SPECIMEN HANDLING OFFICE-LAB: CPT

## 2024-02-27 PROCEDURE — 86258 DGP ANTIBODY EACH IG CLASS: CPT

## 2024-02-27 PROCEDURE — 86364 TISS TRNSGLTMNASE EA IG CLAS: CPT

## 2024-02-28 LAB
GLIADIN IGA SER-ACNC: 16 U/ML
GLIADIN IGG SER-ACNC: 16 U/ML
IGA SERPL-MCNC: 240 MG/DL (ref 84–499)
TTG IGA SER-ACNC: 1.4 U/ML
TTG IGG SER-ACNC: 1.9 U/ML

## 2024-03-02 LAB — ENDOMYSIUM IGA TITR SER IF: NORMAL {TITER}

## 2024-03-14 ENCOUNTER — ANESTHESIA EVENT (OUTPATIENT)
Dept: GASTROENTEROLOGY | Facility: CLINIC | Age: 64
End: 2024-03-14
Payer: COMMERCIAL

## 2024-03-14 ASSESSMENT — LIFESTYLE VARIABLES: TOBACCO_USE: 1

## 2024-03-14 NOTE — ANESTHESIA PREPROCEDURE EVALUATION
Anesthesia Pre-Procedure Evaluation    Patient: Randall J Jennissen   MRN: [id number] : 1960        Procedure : Procedure(s):  Colonoscopy          Past Medical History:   Diagnosis Date    Calculus of kidney     Headache(784.0)     likely stress    Keratoconus     Uses contact lenses       Past Surgical History:   Procedure Laterality Date    COLONOSCOPY      COLONOSCOPY N/A 2016    Procedure: COLONOSCOPY;  Surgeon: Sean Lopez MD;  Location: [facility]    SURGICAL HISTORY OF -       ORIF left leg    SURGICAL HISTORY OF -       low back nerve block?      No Known Allergies   Social History     Tobacco Use    Smoking status: Former     Packs/day: 1.00     Years: 20.00     Additional pack years: 0.00     Total pack years: 20.00     Types: Cigarettes     Quit date: 2023     Years since quittin.7    Smokeless tobacco: Never   Substance Use Topics    Alcohol use: Yes     Alcohol/week: 0.0 standard drinks of alcohol     Comment: 12 pack in a week      Wt Readings from Last 1 Encounters:   24 89.8 kg (198 lb)        Anesthesia Evaluation   Pt has had prior anesthetic. Type: MAC.        ROS/MED HX  ENT/Pulmonary:     (+) sleep apnea,               tobacco use, Past use,                       Neurologic:       Cardiovascular:     (+) Dyslipidemia - -   -  - -                                      METS/Exercise Tolerance:     Hematologic:       Musculoskeletal:       GI/Hepatic:     (+)        bowel prep,            Renal/Genitourinary:       Endo:     (+)               Obesity,       Psychiatric/Substance Use:       Infectious Disease:       Malignancy:       Other:      (+)  , H/O Chronic Pain (shouders),         Physical Exam    Airway  airway exam normal      Mallampati: I   TM distance: > 3 FB   Neck ROM: full   Mouth opening: > 3 cm    Respiratory Devices and Support         Dental       (+) Minor Abnormalities - some fillings, tiny chips      Cardiovascular   cardiovascular exam normal      "  Rhythm and rate: regular and normal     Pulmonary   pulmonary exam normal        breath sounds clear to auscultation           OUTSIDE LABS:  CBC:   Lab Results   Component Value Date    WBC 5.8 01/13/2024    WBC 6.0 10/03/2023    HGB 13.8 01/13/2024    HGB 14.1 10/03/2023    HCT 40.7 01/13/2024    HCT 42.1 10/03/2023     01/13/2024     10/03/2023     BMP:   Lab Results   Component Value Date     10/03/2023     12/31/2021    POTASSIUM 4.3 10/03/2023    POTASSIUM 4.7 12/31/2021    CHLORIDE 103 10/03/2023    CHLORIDE 106 12/31/2021    CO2 21 (L) 10/03/2023    CO2 29 12/31/2021    BUN 21.3 10/03/2023    BUN 23 12/31/2021    CR 1.17 10/03/2023    CR 0.90 12/31/2021     (H) 10/03/2023    GLC 92 12/31/2021     COAGS: No results found for: \"PTT\", \"INR\", \"FIBR\"  POC: No results found for: \"BGM\", \"HCG\", \"HCGS\"  HEPATIC:   Lab Results   Component Value Date    ALBUMIN 4.3 10/03/2023    PROTTOTAL 7.1 10/03/2023    ALT 27 10/03/2023    AST 29 10/03/2023    ALKPHOS 65 10/03/2023    BILITOTAL 0.5 10/03/2023     OTHER:   Lab Results   Component Value Date    HORTENCIA 9.4 10/03/2023    LIPASE 146 05/24/2016    TSH 2.29 12/31/2021    CRP 3.8 12/31/2021    SED 9 02/02/2016       Anesthesia Plan    ASA Status:  2    NPO Status:  NPO Appropriate    Anesthesia Type: General.     - Airway: Native airway   Induction: Propofol, Intravenous.   Maintenance: TIVA.        Consents    Anesthesia Plan(s) and associated risks, benefits, and realistic alternatives discussed. Questions answered and patient/representative(s) expressed understanding.     - Discussed: Risks, Benefits and Alternatives for BOTH SEDATION and the PROCEDURE were discussed     - Discussed with:  Patient      - Extended Intubation/Ventilatory Support Discussed: No.      - Patient is DNR/DNI Status: No     Use of blood products discussed: No .     Postoperative Care            Comments:               TYLER Kam CRNA    I have reviewed " the pertinent notes and labs in the chart from the past 30 days and (re)examined the patient.  Any updates or changes from those notes are reflected in this note.

## 2024-03-22 NOTE — PROGRESS NOTES
Outpatient Physical Therapy Discharge Note     Patient: Randall J Jennissen  : 1960    Beginning/End Dates of Reporting Period:  24 to 24    Referring Provider: Lizzie Austin MD    Therapy Diagnosis:  chronic B shoulder pain      Patient did not return for follow up treatments.  Goal status and current objective information is therefore unknown.  Discharge from PT services at this time for this episode of treatment. Please see attached documentation under this episode of care for further information including dates of service, start of care date, referring physician, Dx, treatment plan, treatments, etc.    Please contact me with any questions or concerns.    Thank you for your referral.    Cary Haas, PT, DPT  Physical Therapist  [address] [address] [phone]

## 2024-03-25 ENCOUNTER — HOSPITAL ENCOUNTER (OUTPATIENT)
Facility: CLINIC | Age: 64
Discharge: HOME | End: 2024-03-25
Attending: SURGERY | Admitting: SURGERY
Payer: COMMERCIAL

## 2024-03-25 ENCOUNTER — ANESTHESIA (OUTPATIENT)
Dept: GASTROENTEROLOGY | Facility: CLINIC | Age: 64
End: 2024-03-25
Payer: COMMERCIAL

## 2024-03-25 VITALS
OXYGEN SATURATION: 97 % | RESPIRATION RATE: 16 BRPM | HEART RATE: 61 BPM | SYSTOLIC BLOOD PRESSURE: 119 MMHG | DIASTOLIC BLOOD PRESSURE: 88 MMHG

## 2024-03-25 LAB — COLONOSCOPY: NORMAL

## 2024-03-25 PROCEDURE — 45378 DIAGNOSTIC COLONOSCOPY: CPT | Performed by: SURGERY

## 2024-03-25 PROCEDURE — 250N000011 HC RX IP 250 OP 636: Performed by: NURSE ANESTHETIST, CERTIFIED REGISTERED

## 2024-03-25 PROCEDURE — 370N000017 HC ANESTHESIA TECHNICAL FEE, PER MIN: Performed by: SURGERY

## 2024-03-25 PROCEDURE — 258N000003 HC RX IP 258 OP 636: Performed by: PHYSICIAN ASSISTANT

## 2024-03-25 PROCEDURE — 250N000009 HC RX 250: Performed by: NURSE ANESTHETIST, CERTIFIED REGISTERED

## 2024-03-25 PROCEDURE — G0121 COLON CA SCRN NOT HI RSK IND: HCPCS | Performed by: SURGERY

## 2024-03-25 RX ORDER — LIDOCAINE HYDROCHLORIDE 20 MG/ML
INJECTION, SOLUTION INFILTRATION; PERINEURAL PRN
Status: DISCONTINUED | OUTPATIENT
Start: 2024-03-25 | End: 2024-03-25

## 2024-03-25 RX ORDER — PROPOFOL 10 MG/ML
INJECTION, EMULSION INTRAVENOUS CONTINUOUS PRN
Status: DISCONTINUED | OUTPATIENT
Start: 2024-03-25 | End: 2024-03-25

## 2024-03-25 RX ORDER — LIDOCAINE 40 MG/G
CREAM TOPICAL
Status: DISCONTINUED | OUTPATIENT
Start: 2024-03-25 | End: 2024-03-25 | Stop reason: HOSPADM

## 2024-03-25 RX ORDER — SODIUM CHLORIDE, SODIUM LACTATE, POTASSIUM CHLORIDE, CALCIUM CHLORIDE 600; 310; 30; 20 MG/100ML; MG/100ML; MG/100ML; MG/100ML
INJECTION, SOLUTION INTRAVENOUS CONTINUOUS
Status: DISCONTINUED | OUTPATIENT
Start: 2024-03-25 | End: 2024-03-25 | Stop reason: HOSPADM

## 2024-03-25 RX ADMIN — LIDOCAINE HYDROCHLORIDE 100 MG: 20 INJECTION, SOLUTION INFILTRATION; PERINEURAL at 08:45

## 2024-03-25 RX ADMIN — SODIUM CHLORIDE, POTASSIUM CHLORIDE, SODIUM LACTATE AND CALCIUM CHLORIDE: 600; 310; 30; 20 INJECTION, SOLUTION INTRAVENOUS at 08:19

## 2024-03-25 RX ADMIN — PROPOFOL 150 MCG/KG/MIN: 10 INJECTION, EMULSION INTRAVENOUS at 08:45

## 2024-03-25 ASSESSMENT — ACTIVITIES OF DAILY LIVING (ADL)
ADLS_ACUITY_SCORE: 35
ADLS_ACUITY_SCORE: 35

## 2024-03-25 NOTE — ANESTHESIA POSTPROCEDURE EVALUATION
Patient: Randall J Jennissen    Procedure: Procedure(s):  Colonoscopy       Anesthesia Type:  General    Note:  Disposition: Outpatient   Postop Pain Control: Uneventful            Sign Out: Well controlled pain   PONV: No   Neuro/Psych: Uneventful            Sign Out: Acceptable/Baseline neuro status   Airway/Respiratory: Uneventful            Sign Out: Acceptable/Baseline resp. status   CV/Hemodynamics: Uneventful            Sign Out: Acceptable CV status; No obvious hypovolemia; No obvious fluid overload   Other NRE: NONE   DID A NON-ROUTINE EVENT OCCUR? No           Last vitals:  Vitals:    03/25/24 0734   BP: (P) 113/87   Pulse: (P) 74   Resp: (P) 18   Temp: (P) 36.6  C (97.8  F)       Electronically Signed By: TYLER Herron CRNA  March 25, 2024  9:01 AM

## 2024-03-25 NOTE — H&P
[facility]    Pre-Endoscopy History and Physical     Randall J Jennissen MRN# [id number]   YOB: 1960 Age: 63 year old     Date of Procedure: 3/25/2024  Primary care provider: [facility] - [facility]  Type of Endoscopy: Colonoscopy with possible biopsy, possible polypectomy  Reason for Procedure: screening  Type of Anesthesia Anticipated: MAC    HPI:    Carmelo is a 63 year old male who will be undergoing the above procedure.  colon 2016 (nl); no famhx of colon cancer; no blood thinner    A history and physical has been performed. The patient's medications and allergies have been reviewed. The risks and benefits of the procedure and the sedation options and risks were discussed with the patient.  All questions were answered and informed consent was obtained.      He denies a personal or family history of anesthesia complications or bleeding disorders.     Patient Active Problem List   Diagnosis    Hyperlipidemia LDL goal <160    Allergic conjunctivitis    Non morbid obesity due to excess calories    JO (obstructive sleep apnea)    Neuropathic pain    Chronic pain of both shoulders        Past Medical History:   Diagnosis Date    Calculus of kidney     Headache(784.0)     likely stress    Keratoconus     Uses contact lenses         Past Surgical History:   Procedure Laterality Date    COLONOSCOPY      COLONOSCOPY N/A 2016    Procedure: COLONOSCOPY;  Surgeon: Sean Lopez MD;  Location: [facility]    SURGICAL HISTORY OF -       ORIF left leg    SURGICAL HISTORY OF -       low back nerve block?       Social History     Tobacco Use    Smoking status: Former     Packs/day: 1.00     Years: 20.00     Additional pack years: 0.00     Total pack years: 20.00     Types: Cigarettes     Quit date: 2023     Years since quittin.7    Smokeless tobacco: Never   Substance Use Topics    Alcohol use: Yes     Alcohol/week: 0.0 standard drinks of alcohol     Comment:   "pack in a week       Family History   Problem Relation Age of Onset    Cancer - colorectal No family hx of     Prostate Cancer No family hx of     C.A.D. No family hx of        Prior to Admission medications    Medication Sig Start Date End Date Taking? Authorizing Provider   cyclobenzaprine (FLEXERIL) 10 MG tablet Take 1 tablet (10 mg) by mouth nightly as needed for muscle spasms 2/5/24   Tucker Tucker MD   gabapentin (NEURONTIN) 300 MG capsule Take 1 capsule (300 mg) by mouth at bedtime for 60 days 12/15/23 2/13/24  Lizzie Austin MD   IBUPROFEN 200 MG OR TABS 600 mg by mouth at bedtime 11/26/07   Devin Monteiro MD   methylPREDNISolone (MEDROL DOSEPAK) 4 MG tablet therapy pack Follow Package Directions 2/5/24   Tucker Tucker MD       No Known Allergies     REVIEW OF SYSTEMS:   5 point ROS negative except as noted above in HPI, including Gen., Resp., CV, GI &  system review.    PHYSICAL EXAM:   There were no vitals taken for this visit. Estimated body mass index is 28.41 kg/m  as calculated from the following:    Height as of 12/15/23: 1.778 m (5' 10\").    Weight as of 2/5/24: 89.8 kg (198 lb).   Constitutional: Awake, alert, no acute distress.  Eyes: No scleral icterus.  Conjunctiva are without injection.  ENMT: Mucous membranes moist, dentition and gums are intact.   Neck: Soft, supple, trachea midline.    Endocrine: n/a   Lymphatic: There is no cervical, submandibularadenopathy.  Respiratory: normal effortgs   Cardiovascular: S1, S2  Abdomen: Non-distended, non-tender,  No masses,  Musculoskeletal: No spinal or CVA tenderness. Full range of motion in the upper and lower extremities.    Skin: No skin rashes or lesions to inspection.  No petechia.    Neurologic: alerted and oriented 3x  Psychiatric: The patient's affect is not blunted and mood is appropriate.  DIAGNOSTICS:    Not indicated    IMPRESSION   ASA Class 2 - Mild systemic disease    PLAN:   Plan for Colonoscopy with possible " biopsy, possible polypectomy. We discussed the risks, benefits and alternatives and the patient wished to proceed.  Patient is cleared for the above procedure.    The above has been forwarded to the consulting provider.    [facility], [facility], [facility]

## 2024-06-10 ENCOUNTER — OFFICE VISIT (OUTPATIENT)
Dept: FAMILY MEDICINE | Facility: CLINIC | Age: 64
End: 2024-06-10
Payer: COMMERCIAL

## 2024-06-10 VITALS
WEIGHT: 201 LBS | OXYGEN SATURATION: 98 % | DIASTOLIC BLOOD PRESSURE: 70 MMHG | TEMPERATURE: 97.5 F | SYSTOLIC BLOOD PRESSURE: 110 MMHG | BODY MASS INDEX: 28.77 KG/M2 | HEART RATE: 62 BPM | RESPIRATION RATE: 14 BRPM | HEIGHT: 70 IN

## 2024-06-10 DIAGNOSIS — R13.10 DYSPHAGIA, UNSPECIFIED TYPE: Primary | ICD-10-CM

## 2024-06-10 DIAGNOSIS — R07.9 CHEST PAIN, UNSPECIFIED TYPE: ICD-10-CM

## 2024-06-10 DIAGNOSIS — R53.83 OTHER FATIGUE: ICD-10-CM

## 2024-06-10 DIAGNOSIS — R09.A2 GLOBUS SENSATION: ICD-10-CM

## 2024-06-10 DIAGNOSIS — E78.5 HYPERLIPIDEMIA LDL GOAL <160: ICD-10-CM

## 2024-06-10 LAB
CHOLEST SERPL-MCNC: 253 MG/DL
FASTING STATUS PATIENT QL REPORTED: NO
HDLC SERPL-MCNC: 57 MG/DL
LDLC SERPL CALC-MCNC: 178 MG/DL
NONHDLC SERPL-MCNC: 196 MG/DL
TRIGL SERPL-MCNC: 90 MG/DL

## 2024-06-10 PROCEDURE — 36415 COLL VENOUS BLD VENIPUNCTURE: CPT | Performed by: FAMILY MEDICINE

## 2024-06-10 PROCEDURE — G2211 COMPLEX E/M VISIT ADD ON: HCPCS | Performed by: FAMILY MEDICINE

## 2024-06-10 PROCEDURE — 80061 LIPID PANEL: CPT | Performed by: FAMILY MEDICINE

## 2024-06-10 PROCEDURE — 99214 OFFICE O/P EST MOD 30 MIN: CPT | Performed by: FAMILY MEDICINE

## 2024-06-10 PROCEDURE — 93000 ELECTROCARDIOGRAM COMPLETE: CPT | Performed by: FAMILY MEDICINE

## 2024-06-10 RX ORDER — RESPIRATORY SYNCYTIAL VIRUS VACCINE 120MCG/0.5
0.5 KIT INTRAMUSCULAR ONCE
Qty: 1 EACH | Refills: 0 | Status: CANCELLED | OUTPATIENT
Start: 2024-06-10 | End: 2024-06-10

## 2024-06-10 ASSESSMENT — PAIN SCALES - GENERAL: PAINLEVEL: NO PAIN (0)

## 2024-06-10 NOTE — PROGRESS NOTES
"  Assessment & Plan     Dysphagia, unspecified type  Globus sensation  Chronic, around 3 years - worse with apples, eggs  Brother with celiacs disease - newly diagnosed  No N/V, unintentional weight loss, diarrhea  Deamidated giladin peptide antibody positive  Discussed doing a trial of PPI - patient would like to defer this for now  - Adult GI  Referral - Procedure Only; Future    Chest pain, unspecified type  EKG today unremarkable  Last saw patient 12/2023 - he did have chest/ shoulder pain then that had resolved - decided to monitor then as felt like cause was msk, anxiety  Symptoms recurred 10 days ago with more intensity  Plan for stress test  Repeat lipid panel in 3 months    The 10-year ASCVD risk score (Lyla WALLACE, et al., 2019) is: 9.7%    Values used to calculate the score:      Age: 63 years      Sex: Male      Is Non- : No      Diabetic: No      Tobacco smoker: No      Systolic Blood Pressure: 110 mmHg      Is BP treated: No      HDL Cholesterol: 65 mg/dL      Total Cholesterol: 243 mg/dL   Patient would like to do lifestyle changes, if no improvement will consider statin    - EKG 12-lead complete w/read - Clinics  - Exercise Stress Test - Adult; Future  - Lipid panel reflex to direct LDL Fasting; Future    Other Fatigue  History of positive JO, that resolved with subsequent sleep study after losing weight - never on CPAP  Plan for weight loss - lifestyle changes with diet and exercise  If no improvement obtain further labs with thyroid labs and consider sleep study  Patient takes supplement that includes biotin - will need to hold for at least 4 weeks  Cbc, lyme, cmp previously unremarkable          BMI  Estimated body mass index is 28.84 kg/m  as calculated from the following:    Height as of this encounter: 1.778 m (5' 10\").    Weight as of this encounter: 91.2 kg (201 lb).             Dorys Doe is a 63 year old, presenting for the following health " "issues:  Fatigue (Brother was dx with celiac and he is concerned ) and Chest Pain    History of Present Illness       Reason for visit:   follow up on ceilia    He eats 0-1 servings of fruits and vegetables daily.He consumes 1 sweetened beverage(s) daily.He exercises with enough effort to increase his heart rate 9 or less minutes per day.  He exercises with enough effort to increase his heart rate 3 or less days per week.   He is taking medications regularly.         Chest Pain  Onset/Duration: Years  Description:   Location: left side  Character: sharp  Radiation: on left side  Duration: Seconds   Intensity: severe  Progression of Symptoms: intermittent  Accompanying Signs & Symptoms:  Shortness of breath: yes ????   Sweating: No  Nausea/vomiting: No  Lightheadedness: No  Palpitations: No  Fever/Chills: No  Cough: ???            Heartburn: No  History:   Family history of heart disease: No  Tobacco use: Former   none for 1 year  Previous similar symptoms: YES  Precipitating factors:   Worse with exertion: random  Worse with deep breaths: ???           Related to eating: No           Better with burping: No  Alleviating factors:   Therapies tried and outcome: nothing      Around 10 days ago bad chest chest pain while changing tire around 3 times, next day around a couple of more times last for a couple minutes  No sweating  Stabbing left side    No known fam hx of heart problems, no diabetes    Not really physically active any more    Last time this happened in December    Steroids do help    Fatigue - more              Objective    /70   Pulse 62   Temp 97.5  F (36.4  C) (Tympanic)   Resp 14   Ht 1.778 m (5' 10\")   Wt 91.2 kg (201 lb)   SpO2 98%   BMI 28.84 kg/m    Body mass index is 28.84 kg/m .  Physical Exam   GENERAL: alert and no distress  NECK: no adenopathy, no asymmetry, masses, or scars  RESP: lungs clear to auscultation - no rales, rhonchi or wheezes  CV: regular rate and rhythm, normal S1 " S2, no S3 or S4, no murmur, click or rub, no peripheral edema  MS: no gross musculoskeletal defects noted, no edema            Signed Electronically by: Lizzie Austin MD

## 2024-06-10 NOTE — NURSING NOTE
"Chief Complaint   Patient presents with    Fatigue     Brother was dx with celiac and he is concerned     Chest Pain       Initial /70   Pulse 62   Temp 97.5  F (36.4  C) (Tympanic)   Resp 14   Ht 1.778 m (5' 10\")   Wt 91.2 kg (201 lb)   SpO2 98%   BMI 28.84 kg/m   Estimated body mass index is 28.84 kg/m  as calculated from the following:    Height as of this encounter: 1.778 m (5' 10\").    Weight as of this encounter: 91.2 kg (201 lb).    Patient presents to the clinic using No DME    Is there anyone who you would like to be able to receive your results? No  If yes have patient fill out ANNE      "

## 2024-06-11 ENCOUNTER — ANESTHESIA EVENT (OUTPATIENT)
Dept: GASTROENTEROLOGY | Facility: CLINIC | Age: 64
End: 2024-06-11
Payer: COMMERCIAL

## 2024-06-11 NOTE — ANESTHESIA PREPROCEDURE EVALUATION
Anesthesia Pre-Procedure Evaluation    Patient: Randall J Jennissen   MRN: [id number] : 1960        Procedure : Procedure(s):  Esophagoscopy, gastroscopy, duodenoscopy (EGD), combined          Past Medical History:   Diagnosis Date    Calculus of kidney     Headache(784.0)     likely stress    Keratoconus     Uses contact lenses       Past Surgical History:   Procedure Laterality Date    COLONOSCOPY      COLONOSCOPY N/A 2016    Procedure: COLONOSCOPY;  Surgeon: Sean Lopez MD;  Location: [facility]    COLONOSCOPY N/A 3/25/2024    Procedure: Colonoscopy;  Surgeon: Mac Lyon MD;  Location: [facility]    SURGICAL HISTORY OF -       ORIF left leg    SURGICAL HISTORY OF -       low back nerve block?      No Known Allergies   Social History     Tobacco Use    Smoking status: Former     Current packs/day: 0.00     Average packs/day: 1 pack/day for 20.0 years (20.0 ttl pk-yrs)     Types: Cigarettes     Start date: 2003     Quit date: 2023     Years since quittin.0    Smokeless tobacco: Never   Substance Use Topics    Alcohol use: Yes     Alcohol/week: 0.0 standard drinks of alcohol     Comment: 12 pack in a week      Wt Readings from Last 1 Encounters:   06/10/24 91.2 kg (201 lb)        Anesthesia Evaluation   Pt has had prior anesthetic. Type: General and Regional.        ROS/MED HX  ENT/Pulmonary:     (+) sleep apnea,               tobacco use,   20  Pack-Year Hx,                      Neurologic:     (+)    peripheral neuropathy,                            Cardiovascular:     (+) Dyslipidemia - -   -  - -                                 Previous cardiac testing   Echo: Date: Results:    Stress Test:  Date: Results:    ECG Reviewed:  Date: 2024 Results:  Sinus Bradycardia   WITHIN NORMAL LIMITS  Cath:  Date: Results:      METS/Exercise Tolerance: >4 METS    Hematologic:       Musculoskeletal:       GI/Hepatic: Comment: dysphagia      Renal/Genitourinary:       Endo:     (+)                "Obesity,       Psychiatric/Substance Use:     (+) psychiatric history anxiety       Infectious Disease:       Malignancy:       Other:      (+)  , H/O Chronic Pain (bilateral shoulders),         Physical Exam    Airway  airway exam normal      Mallampati: II   TM distance: > 3 FB   Neck ROM: full   Mouth opening: > 3 cm    Respiratory Devices and Support         Dental       (+) Minor Abnormalities - some fillings, tiny chips      Cardiovascular   cardiovascular exam normal       Rhythm and rate: regular and normal     Pulmonary   pulmonary exam normal        breath sounds clear to auscultation           OUTSIDE LABS:  CBC:   Lab Results   Component Value Date    WBC 5.8 01/13/2024    WBC 6.0 10/03/2023    HGB 13.8 01/13/2024    HGB 14.1 10/03/2023    HCT 40.7 01/13/2024    HCT 42.1 10/03/2023     01/13/2024     10/03/2023     BMP:   Lab Results   Component Value Date     10/03/2023     12/31/2021    POTASSIUM 4.3 10/03/2023    POTASSIUM 4.7 12/31/2021    CHLORIDE 103 10/03/2023    CHLORIDE 106 12/31/2021    CO2 21 (L) 10/03/2023    CO2 29 12/31/2021    BUN 21.3 10/03/2023    BUN 23 12/31/2021    CR 1.17 10/03/2023    CR 0.90 12/31/2021     (H) 10/03/2023    GLC 92 12/31/2021     COAGS: No results found for: \"PTT\", \"INR\", \"FIBR\"  POC: No results found for: \"BGM\", \"HCG\", \"HCGS\"  HEPATIC:   Lab Results   Component Value Date    ALBUMIN 4.3 10/03/2023    PROTTOTAL 7.1 10/03/2023    ALT 27 10/03/2023    AST 29 10/03/2023    ALKPHOS 65 10/03/2023    BILITOTAL 0.5 10/03/2023     OTHER:   Lab Results   Component Value Date    HORTENCIA 9.4 10/03/2023    LIPASE 146 05/24/2016    TSH 2.29 12/31/2021    CRP 3.8 12/31/2021    SED 9 02/02/2016       Anesthesia Plan    ASA Status:  2    NPO Status:  NPO Appropriate    Anesthesia Type: General.     - Airway: Native airway   Induction: Propofol, Intravenous.   Maintenance: TIVA.        Consents    Anesthesia Plan(s) and associated risks, benefits, and " "realistic alternatives discussed. Questions answered and patient/representative(s) expressed understanding.     - Discussed: Risks, Benefits and Alternatives for BOTH SEDATION and the PROCEDURE were discussed     - Discussed with:  Patient            Postoperative Care       PONV prophylaxis: Background Propofol Infusion     Comments:               TYLER Mae CRNA    I have reviewed the pertinent notes and labs in the chart from the past 30 days and (re)examined the patient.  Any updates or changes from those notes are reflected in this note.              # Overweight: Estimated body mass index is 28.84 kg/m  as calculated from the following:    Height as of 6/10/24: 1.778 m (5' 10\").    Weight as of 6/10/24: 91.2 kg (201 lb).      "

## 2024-06-12 ENCOUNTER — HOSPITAL ENCOUNTER (OUTPATIENT)
Facility: CLINIC | Age: 64
Discharge: HOME | End: 2024-06-12
Attending: SURGERY | Admitting: SURGERY
Payer: COMMERCIAL

## 2024-06-12 ENCOUNTER — ANESTHESIA (OUTPATIENT)
Dept: GASTROENTEROLOGY | Facility: CLINIC | Age: 64
End: 2024-06-12
Payer: COMMERCIAL

## 2024-06-12 VITALS
RESPIRATION RATE: 16 BRPM | WEIGHT: 201 LBS | BODY MASS INDEX: 28.77 KG/M2 | HEART RATE: 58 BPM | SYSTOLIC BLOOD PRESSURE: 125 MMHG | HEIGHT: 70 IN | TEMPERATURE: 97.5 F | DIASTOLIC BLOOD PRESSURE: 77 MMHG | OXYGEN SATURATION: 98 %

## 2024-06-12 DIAGNOSIS — K29.70 GASTRITIS WITHOUT BLEEDING, UNSPECIFIED CHRONICITY, UNSPECIFIED GASTRITIS TYPE: Primary | ICD-10-CM

## 2024-06-12 LAB — UPPER GI ENDOSCOPY: NORMAL

## 2024-06-12 PROCEDURE — 250N000009 HC RX 250

## 2024-06-12 PROCEDURE — 258N000003 HC RX IP 258 OP 636: Mod: JZ | Performed by: SURGERY

## 2024-06-12 PROCEDURE — 250N000009 HC RX 250: Performed by: SURGERY

## 2024-06-12 PROCEDURE — 370N000017 HC ANESTHESIA TECHNICAL FEE, PER MIN: Performed by: SURGERY

## 2024-06-12 PROCEDURE — 88305 TISSUE EXAM BY PATHOLOGIST: CPT | Mod: TC | Performed by: SURGERY

## 2024-06-12 PROCEDURE — 43239 EGD BIOPSY SINGLE/MULTIPLE: CPT | Performed by: SURGERY

## 2024-06-12 PROCEDURE — 88305 TISSUE EXAM BY PATHOLOGIST: CPT | Mod: 26 | Performed by: PATHOLOGY

## 2024-06-12 PROCEDURE — 250N000011 HC RX IP 250 OP 636

## 2024-06-12 PROCEDURE — 88342 IMHCHEM/IMCYTCHM 1ST ANTB: CPT | Mod: 26 | Performed by: PATHOLOGY

## 2024-06-12 RX ORDER — DEXAMETHASONE SODIUM PHOSPHATE 4 MG/ML
4 INJECTION, SOLUTION INTRA-ARTICULAR; INTRALESIONAL; INTRAMUSCULAR; INTRAVENOUS; SOFT TISSUE
Status: DISCONTINUED | OUTPATIENT
Start: 2024-06-12 | End: 2024-06-12 | Stop reason: HOSPADM

## 2024-06-12 RX ORDER — LIDOCAINE HYDROCHLORIDE 20 MG/ML
INJECTION, SOLUTION INFILTRATION; PERINEURAL PRN
Status: DISCONTINUED | OUTPATIENT
Start: 2024-06-12 | End: 2024-06-12

## 2024-06-12 RX ORDER — OMEPRAZOLE 40 MG/1
40 CAPSULE, DELAYED RELEASE ORAL DAILY
Qty: 30 CAPSULE | Refills: 0 | Status: SHIPPED | OUTPATIENT
Start: 2024-06-12

## 2024-06-12 RX ORDER — ONDANSETRON 4 MG/1
4 TABLET, ORALLY DISINTEGRATING ORAL EVERY 30 MIN PRN
Status: DISCONTINUED | OUTPATIENT
Start: 2024-06-12 | End: 2024-06-12 | Stop reason: HOSPADM

## 2024-06-12 RX ORDER — NALOXONE HYDROCHLORIDE 0.4 MG/ML
0.1 INJECTION, SOLUTION INTRAMUSCULAR; INTRAVENOUS; SUBCUTANEOUS
Status: DISCONTINUED | OUTPATIENT
Start: 2024-06-12 | End: 2024-06-12 | Stop reason: HOSPADM

## 2024-06-12 RX ORDER — GLYCOPYRROLATE 0.2 MG/ML
INJECTION, SOLUTION INTRAMUSCULAR; INTRAVENOUS PRN
Status: DISCONTINUED | OUTPATIENT
Start: 2024-06-12 | End: 2024-06-12

## 2024-06-12 RX ORDER — PROPOFOL 10 MG/ML
INJECTION, EMULSION INTRAVENOUS PRN
Status: DISCONTINUED | OUTPATIENT
Start: 2024-06-12 | End: 2024-06-12

## 2024-06-12 RX ORDER — SODIUM CHLORIDE, SODIUM LACTATE, POTASSIUM CHLORIDE, CALCIUM CHLORIDE 600; 310; 30; 20 MG/100ML; MG/100ML; MG/100ML; MG/100ML
INJECTION, SOLUTION INTRAVENOUS CONTINUOUS
Status: DISCONTINUED | OUTPATIENT
Start: 2024-06-12 | End: 2024-06-12 | Stop reason: HOSPADM

## 2024-06-12 RX ORDER — LIDOCAINE 40 MG/G
CREAM TOPICAL
Status: DISCONTINUED | OUTPATIENT
Start: 2024-06-12 | End: 2024-06-12 | Stop reason: HOSPADM

## 2024-06-12 RX ORDER — ROSUVASTATIN CALCIUM 10 MG/1
10 TABLET, COATED ORAL DAILY
Qty: 90 TABLET | Refills: 0 | Status: SHIPPED | OUTPATIENT
Start: 2024-06-12 | End: 2024-08-20

## 2024-06-12 RX ORDER — ONDANSETRON 2 MG/ML
4 INJECTION INTRAMUSCULAR; INTRAVENOUS EVERY 30 MIN PRN
Status: DISCONTINUED | OUTPATIENT
Start: 2024-06-12 | End: 2024-06-12 | Stop reason: HOSPADM

## 2024-06-12 RX ORDER — FLUMAZENIL 0.1 MG/ML
0.2 INJECTION, SOLUTION INTRAVENOUS
Status: CANCELLED | OUTPATIENT
Start: 2024-06-12 | End: 2024-06-13

## 2024-06-12 RX ORDER — ONDANSETRON 2 MG/ML
4 INJECTION INTRAMUSCULAR; INTRAVENOUS
Status: DISCONTINUED | OUTPATIENT
Start: 2024-06-12 | End: 2024-06-12 | Stop reason: HOSPADM

## 2024-06-12 RX ADMIN — PROPOFOL 30 MG: 10 INJECTION, EMULSION INTRAVENOUS at 12:52

## 2024-06-12 RX ADMIN — GLYCOPYRROLATE 0.2 MG: 0.2 INJECTION, SOLUTION INTRAMUSCULAR; INTRAVENOUS at 12:40

## 2024-06-12 RX ADMIN — PROPOFOL 50 MG: 10 INJECTION, EMULSION INTRAVENOUS at 12:46

## 2024-06-12 RX ADMIN — LIDOCAINE HYDROCHLORIDE 0.1 ML: 10 INJECTION, SOLUTION EPIDURAL; INFILTRATION; INTRACAUDAL; PERINEURAL at 12:00

## 2024-06-12 RX ADMIN — PROPOFOL 50 MG: 10 INJECTION, EMULSION INTRAVENOUS at 12:47

## 2024-06-12 RX ADMIN — PROPOFOL 100 MG: 10 INJECTION, EMULSION INTRAVENOUS at 12:45

## 2024-06-12 RX ADMIN — LIDOCAINE HYDROCHLORIDE 150 MG: 20 INJECTION, SOLUTION INFILTRATION; PERINEURAL at 12:41

## 2024-06-12 RX ADMIN — PROPOFOL 70 MG: 10 INJECTION, EMULSION INTRAVENOUS at 12:53

## 2024-06-12 RX ADMIN — SODIUM CHLORIDE, POTASSIUM CHLORIDE, SODIUM LACTATE AND CALCIUM CHLORIDE: 600; 310; 30; 20 INJECTION, SOLUTION INTRAVENOUS at 12:00

## 2024-06-12 ASSESSMENT — LIFESTYLE VARIABLES: TOBACCO_USE: 1

## 2024-06-12 ASSESSMENT — ACTIVITIES OF DAILY LIVING (ADL)
ADLS_ACUITY_SCORE: 35
ADLS_ACUITY_SCORE: 35

## 2024-06-12 NOTE — H&P
"[facility]    Pre-Endoscopy History and Physical     Randall J Jennissen MRN# [id number]   YOB: 1960 Age: 63 year old     Date of Procedure: 6/12/2024  Primary care provider: [facility]  Type of Endoscopy: Gastroscopy with possible biopsy, possible dilation  Reason for Procedure: Dysphagia  Type of Anesthesia Anticipated: Conscious Sedation    HPI:    Carmelo is a 63 year old male who will be undergoing the above procedure.    \"Years' of dysphagia.  Worse with solids, no issues with liquids.  Did smoke up to 1 year prior.  Does drink alcohol.    A history and physical has been performed. The patient's medications and allergies have been reviewed. The risks and benefits of the procedure and the sedation options and risks were discussed with the patient.  All questions were answered and informed consent was obtained.      He denies a personal or family history of anesthesia complications or bleeding disorders.     Patient Active Problem List   Diagnosis    Hyperlipidemia LDL goal <160    Allergic conjunctivitis    Non morbid obesity due to excess calories    JO (obstructive sleep apnea)    Neuropathic pain    Chronic pain of both shoulders        Past Medical History:   Diagnosis Date    Calculus of kidney     Headache(784.0)     likely stress    Keratoconus     Uses contact lenses         Past Surgical History:   Procedure Laterality Date    COLONOSCOPY      COLONOSCOPY N/A 12/8/2016    Procedure: COLONOSCOPY;  Surgeon: Sean Lopez MD;  Location: [facility]    COLONOSCOPY N/A 3/25/2024    Procedure: Colonoscopy;  Surgeon: Mac Lyon MD;  Location: [facility]    SURGICAL HISTORY OF -       ORIF left leg    SURGICAL HISTORY OF -       low back nerve block?       Social History     Tobacco Use    Smoking status: Former     Current packs/day: 0.00     Average packs/day: 1 pack/day for 20.0 years (20.0 ttl pk-yrs)     Types: Cigarettes     Start date: " "2003     Quit date: 2023     Years since quittin.0    Smokeless tobacco: Never   Substance Use Topics    Alcohol use: Yes     Alcohol/week: 0.0 standard drinks of alcohol     Comment: 12 pack in a week       Family History   Problem Relation Age of Onset    Cancer - colorectal No family hx of     Prostate Cancer No family hx of     C.A.D. No family hx of        Prior to Admission medications    Medication Sig Start Date End Date Taking? Authorizing Provider   IBUPROFEN 200 MG OR TABS 600 mg by mouth at bedtime 07  Yes Devin Monteiro MD   NEW MED Umary one tab at HS   Yes Reported, Patient   rosuvastatin (CRESTOR) 10 MG tablet Take 1 tablet (10 mg) by mouth daily for 90 days 6/12/24 9/10/24 Yes Lizzie Austin MD       No Known Allergies     REVIEW OF SYSTEMS:   5 point ROS negative except as noted above in HPI, including Gen., Resp., CV, GI &  system review.    PHYSICAL EXAM:   /86 (BP Location: Right arm)   Pulse 64   Temp 98  F (36.7  C) (Oral)   Ht 1.778 m (5' 10\")   Wt 91.2 kg (201 lb)   SpO2 98%   BMI 28.84 kg/m   Estimated body mass index is 28.84 kg/m  as calculated from the following:    Height as of this encounter: 1.778 m (5' 10\").    Weight as of this encounter: 91.2 kg (201 lb).   Constitutional: Awake, alert, no acute distress.  Eyes: No scleral icterus.  Conjunctiva are without injection.  ENMT: Mucous membranes moist, dentition and gums are intact.   Neck: Soft, supple, trachea midline.    Endocrine: n/a   Lymphatic: There is no cervical, submandibularadenopathy.  Respiratory: normal effortgs   Cardiovascular: S1, S2  Abdomen: Non-distended, non-tender,  No masses,  Musculoskeletal: No spinal or CVA tenderness. Full range of motion in the upper and lower extremities.    Skin: No skin rashes or lesions to inspection.  No petechia.    Neurologic: alerted and oriented 3x  Psychiatric: The patient's affect is not blunted and mood is appropriate.  DIAGNOSTICS:  "   Not indicated    IMPRESSION   ASA Class 3 - Severe systemic disease, but not incapacitating    PLAN:   Plan for Gastroscopy with possible biopsy, possible dilation. We discussed the risks, benefits and alternatives and the patient wished to proceed.  Patient is cleared for the above procedure.    The above has been forwarded to the consulting provider.    Candelario Sharp DO  [facility]

## 2024-06-12 NOTE — ANESTHESIA CARE TRANSFER NOTE
Patient: Randall J Jennissen    Procedure: Procedure(s):  ESOPHAGOGASTRODUODENOSCOPY, WITH BIOPSY       Diagnosis: Dysphagia, unspecified type [R13.10]  Diagnosis Additional Information: No value filed.    Anesthesia Type:   General     Note:    Oropharynx: oropharynx clear of all foreign objects and spontaneously breathing  Level of Consciousness: drowsy  Oxygen Supplementation: room air    Independent Airway: airway patency satisfactory and stable  Dentition: dentition unchanged  Vital Signs Stable: post-procedure vital signs reviewed and stable  Report to RN Given: handoff report given  Patient transferred to: Phase II    Handoff Report: Identifed the Patient, Identified the Reponsible Provider, Reviewed the pertinent medical history, Discussed the surgical course, Reviewed Intra-OP anesthesia mangement and issues during anesthesia, Set expectations for post-procedure period and Allowed opportunity for questions and acknowledgement of understanding      Vitals:  Vitals Value Taken Time   BP     Temp     Pulse     Resp     SpO2 98 % 06/12/24 1258   Vitals shown include unfiled device data.    Electronically Signed By: TYLER Amaya CRNA  June 12, 2024  1:00 PM

## 2024-06-12 NOTE — ANESTHESIA POSTPROCEDURE EVALUATION
Patient: Randall J Jennissen    Procedure: Procedure(s):  ESOPHAGOGASTRODUODENOSCOPY, WITH BIOPSY       Anesthesia Type:  General    Note:  Disposition: Outpatient   Postop Pain Control: Uneventful            Sign Out: Well controlled pain   PONV: No   Neuro/Psych: Uneventful            Sign Out: Acceptable/Baseline neuro status   Airway/Respiratory: Uneventful            Sign Out: Acceptable/Baseline resp. status   CV/Hemodynamics: Uneventful            Sign Out: Acceptable CV status; No obvious hypovolemia; No obvious fluid overload   Other NRE: NONE   DID A NON-ROUTINE EVENT OCCUR? No           Last vitals:  Vitals Value Taken Time   /69 06/12/24 1315   Temp 36.4  C (97.5  F) 06/12/24 1300   Pulse 62 06/12/24 1315   Resp 16 06/12/24 1300   SpO2 97 % 06/12/24 1319   Vitals shown include unfiled device data.    Electronically Signed By: TYLER Amaya CRNA  June 12, 2024  1:21 PM

## 2024-06-12 NOTE — LETTER
Randall J Jennissen  [address] [facility] [id number]  June 20, 2024    Dear Carmelo,   This letter is to inform you of the results of your pathology report on your upper endoscopy (EGD).    Your pathology report was:  Showed findings consistent with a mildly inflamed stomach. If you continue to have symptoms please follow up with your primary care doctor or with myself.    Final Diagnosis   A(1). Duodenum, biopsy:  -Small intestinal mucosa with no significant histopathologic abnormalities.  -Normal villous architecture identified and no prominence in intraepithelial lymphocytes seen.  -Negative for luminal organisms.  -Negative for dysplasia or malignancy.        B(2). Stomach, antrum, biopsy  -Antral  type gastric mucosa with focally active chronic gastritis.  -Negative for H. Pylori organisms on immunohistochemical stains.  - Negative for intestinal metaplasia.   -Negative for dysplasia or malignancy        C(3). Esophagus, distal, biopsy:  -Squamous mucosa with reactive epithelial changes of the type seen in reflux esophagitis  -Negative for intestinal metaplasia.  -Negative for acute or eosinophilic esophagitis.  -Negative for dysplasia or malignancy.        D(4).  Esophagus, middle, biopsies:  -Squamous mucosa with no significant pathological changes.  -Negative for intestinal metaplasia.  -Negative for acute or eosinophilic esophagitis.  -Negative for dysplasia or malignancy.       If you have any questions or concerns please do not hesitate to call my office at [phone].  Sincerely,   Candelario Sharp, DO   [facility]

## 2024-06-17 LAB
PATH REPORT.COMMENTS IMP SPEC: NORMAL
PATH REPORT.COMMENTS IMP SPEC: NORMAL
PATH REPORT.FINAL DX SPEC: NORMAL
PATH REPORT.GROSS SPEC: NORMAL
PATH REPORT.MICROSCOPIC SPEC OTHER STN: NORMAL
PATH REPORT.MICROSCOPIC SPEC OTHER STN: NORMAL
PATH REPORT.RELEVANT HX SPEC: NORMAL
PHOTO IMAGE: NORMAL

## 2024-06-18 ENCOUNTER — HOSPITAL ENCOUNTER (OUTPATIENT)
Dept: CARDIOLOGY | Facility: CLINIC | Age: 64
Discharge: HOME | End: 2024-06-18
Attending: FAMILY MEDICINE | Admitting: FAMILY MEDICINE
Payer: COMMERCIAL

## 2024-06-18 DIAGNOSIS — R07.9 CHEST PAIN, UNSPECIFIED TYPE: ICD-10-CM

## 2024-06-18 PROCEDURE — 93017 CV STRESS TEST TRACING ONLY: CPT

## 2024-06-18 PROCEDURE — 93016 CV STRESS TEST SUPVJ ONLY: CPT | Performed by: STUDENT IN AN ORGANIZED HEALTH CARE EDUCATION/TRAINING PROGRAM

## 2024-06-18 PROCEDURE — 93018 CV STRESS TEST I&R ONLY: CPT | Performed by: STUDENT IN AN ORGANIZED HEALTH CARE EDUCATION/TRAINING PROGRAM

## 2024-06-20 ENCOUNTER — MYC MEDICAL ADVICE (OUTPATIENT)
Dept: FAMILY MEDICINE | Facility: CLINIC | Age: 64
End: 2024-06-20
Payer: COMMERCIAL

## 2024-06-20 DIAGNOSIS — R07.89 DISCOMFORT IN CHEST: ICD-10-CM

## 2024-06-20 DIAGNOSIS — R07.9 CHEST PAIN, UNSPECIFIED TYPE: ICD-10-CM

## 2024-06-20 DIAGNOSIS — M12.9 ARTHROPATHY, UNSPECIFIED: ICD-10-CM

## 2024-06-20 DIAGNOSIS — R53.83 OTHER FATIGUE: Primary | ICD-10-CM

## 2024-06-20 NOTE — TELEPHONE ENCOUNTER
"OV 6/10/24  \"If no improvement obtain further labs with thyroid labs and consider sleep study  Patient takes supplement that includes biotin - will need to hold for at least 4 weeks  Cbc, lyme, cmp previously unremarkable\"    Sleep study pended for consideration.  "

## 2024-06-21 NOTE — TELEPHONE ENCOUNTER
Spoke with patient, relayed Dr. Lizzie Austin's detailed message and he verbalized good understanding.     Patient states he is wanting to start the zio patch and hold off on beta blocker until the zio patch is completed.     Zio patch order pended and routed to Dr. Lizzie Austin for consideration.    Julie Behrendt RN

## 2024-06-26 NOTE — TELEPHONE ENCOUNTER
See my chart messages. Stress test completed 06/18/24. Patient asking for Zio patch which was pended by another RN.   Please advise, Meenu COVARRUBIAS, RN

## 2024-06-28 NOTE — TELEPHONE ENCOUNTER
Patient notified, states he would like to have the zio patch 7 day to be mailed out and not applied by MA.    Order placed for zio patch mail out 7 day per written order Dr. Lizzie Austin.    Julie Behrendt RN

## 2024-07-29 ENCOUNTER — TELEPHONE (OUTPATIENT)
Dept: FAMILY MEDICINE | Facility: CLINIC | Age: 64
End: 2024-07-29
Payer: COMMERCIAL

## 2024-07-29 NOTE — TELEPHONE ENCOUNTER
Reaching out to [facility] to schedule Wellness visit.  Scheduled.    Lara Mccall,[facility]

## 2024-08-19 PROCEDURE — 93227 XTRNL ECG REC<48 HR R&I: CPT | Performed by: INTERNAL MEDICINE

## 2024-08-20 ENCOUNTER — OFFICE VISIT (OUTPATIENT)
Dept: FAMILY MEDICINE | Facility: CLINIC | Age: 64
End: 2024-08-20
Payer: COMMERCIAL

## 2024-08-20 VITALS
OXYGEN SATURATION: 97 % | BODY MASS INDEX: 29.03 KG/M2 | RESPIRATION RATE: 16 BRPM | HEIGHT: 69 IN | SYSTOLIC BLOOD PRESSURE: 110 MMHG | DIASTOLIC BLOOD PRESSURE: 64 MMHG | HEART RATE: 74 BPM | WEIGHT: 196 LBS | TEMPERATURE: 97.8 F

## 2024-08-20 DIAGNOSIS — Z00.00 ENCOUNTER FOR MEDICARE ANNUAL WELLNESS EXAM: Primary | ICD-10-CM

## 2024-08-20 DIAGNOSIS — R53.83 OTHER FATIGUE: ICD-10-CM

## 2024-08-20 DIAGNOSIS — M25.50 MULTIPLE JOINT PAIN: ICD-10-CM

## 2024-08-20 DIAGNOSIS — J02.9 SORE THROAT: ICD-10-CM

## 2024-08-20 DIAGNOSIS — E78.5 HYPERLIPIDEMIA LDL GOAL <160: ICD-10-CM

## 2024-08-20 DIAGNOSIS — Z13.6 CARDIOVASCULAR SCREENING; LDL GOAL LESS THAN 160: ICD-10-CM

## 2024-08-20 DIAGNOSIS — K21.00 GASTROESOPHAGEAL REFLUX DISEASE WITH ESOPHAGITIS WITHOUT HEMORRHAGE: ICD-10-CM

## 2024-08-20 LAB
ALBUMIN SERPL BCG-MCNC: 4.4 G/DL (ref 3.5–5.2)
ALP SERPL-CCNC: 80 U/L (ref 40–150)
ALT SERPL W P-5'-P-CCNC: 24 U/L (ref 0–70)
ANION GAP SERPL CALCULATED.3IONS-SCNC: 9 MMOL/L (ref 7–15)
AST SERPL W P-5'-P-CCNC: 28 U/L (ref 0–45)
BILIRUB SERPL-MCNC: 0.6 MG/DL
BUN SERPL-MCNC: 15.6 MG/DL (ref 8–23)
CALCIUM SERPL-MCNC: 9.7 MG/DL (ref 8.8–10.4)
CHLORIDE SERPL-SCNC: 101 MMOL/L (ref 98–107)
CHOLEST SERPL-MCNC: 167 MG/DL
CREAT SERPL-MCNC: 1.15 MG/DL (ref 0.67–1.17)
DEPRECATED S PYO AG THROAT QL EIA: NEGATIVE
EGFRCR SERPLBLD CKD-EPI 2021: 71 ML/MIN/1.73M2
ERYTHROCYTE [DISTWIDTH] IN BLOOD BY AUTOMATED COUNT: 13 % (ref 10–15)
FASTING STATUS PATIENT QL REPORTED: YES
FASTING STATUS PATIENT QL REPORTED: YES
GLUCOSE SERPL-MCNC: 85 MG/DL (ref 70–99)
GROUP A STREP BY PCR: NOT DETECTED
HCO3 SERPL-SCNC: 26 MMOL/L (ref 22–29)
HCT VFR BLD AUTO: 44.1 % (ref 40–53)
HDLC SERPL-MCNC: 54 MG/DL
HGB BLD-MCNC: 14.5 G/DL (ref 13.3–17.7)
LDLC SERPL CALC-MCNC: 94 MG/DL
MCH RBC QN AUTO: 30.6 PG (ref 26.5–33)
MCHC RBC AUTO-ENTMCNC: 32.9 G/DL (ref 31.5–36.5)
MCV RBC AUTO: 93 FL (ref 78–100)
NONHDLC SERPL-MCNC: 113 MG/DL
PLATELET # BLD AUTO: 245 10E3/UL (ref 150–450)
POTASSIUM SERPL-SCNC: 4.6 MMOL/L (ref 3.4–5.3)
PROT SERPL-MCNC: 7.5 G/DL (ref 6.4–8.3)
RBC # BLD AUTO: 4.74 10E6/UL (ref 4.4–5.9)
SODIUM SERPL-SCNC: 136 MMOL/L (ref 135–145)
TRIGL SERPL-MCNC: 94 MG/DL
WBC # BLD AUTO: 8 10E3/UL (ref 4–11)

## 2024-08-20 PROCEDURE — 85027 COMPLETE CBC AUTOMATED: CPT | Performed by: FAMILY MEDICINE

## 2024-08-20 PROCEDURE — 99214 OFFICE O/P EST MOD 30 MIN: CPT | Mod: 25 | Performed by: FAMILY MEDICINE

## 2024-08-20 PROCEDURE — 80053 COMPREHEN METABOLIC PANEL: CPT | Performed by: FAMILY MEDICINE

## 2024-08-20 PROCEDURE — 36415 COLL VENOUS BLD VENIPUNCTURE: CPT | Performed by: FAMILY MEDICINE

## 2024-08-20 PROCEDURE — 80061 LIPID PANEL: CPT | Performed by: FAMILY MEDICINE

## 2024-08-20 PROCEDURE — G0402 INITIAL PREVENTIVE EXAM: HCPCS | Performed by: FAMILY MEDICINE

## 2024-08-20 PROCEDURE — 87651 STREP A DNA AMP PROBE: CPT | Performed by: FAMILY MEDICINE

## 2024-08-20 RX ORDER — PANTOPRAZOLE SODIUM 20 MG/1
40 TABLET, DELAYED RELEASE ORAL DAILY
Qty: 120 TABLET | Refills: 0 | Status: SHIPPED | OUTPATIENT
Start: 2024-08-20 | End: 2024-10-19

## 2024-08-20 RX ORDER — SUCRALFATE 1 G/1
1 TABLET ORAL 4 TIMES DAILY
Qty: 30 TABLET | Refills: 0 | Status: SHIPPED | OUTPATIENT
Start: 2024-08-20

## 2024-08-20 RX ORDER — ROSUVASTATIN CALCIUM 10 MG/1
10 TABLET, COATED ORAL DAILY
Qty: 90 TABLET | Refills: 0 | Status: SHIPPED | OUTPATIENT
Start: 2024-08-20

## 2024-08-20 SDOH — HEALTH STABILITY: PHYSICAL HEALTH: ON AVERAGE, HOW MANY DAYS PER WEEK DO YOU ENGAGE IN MODERATE TO STRENUOUS EXERCISE (LIKE A BRISK WALK)?: 1 DAY

## 2024-08-20 ASSESSMENT — SOCIAL DETERMINANTS OF HEALTH (SDOH): HOW OFTEN DO YOU GET TOGETHER WITH FRIENDS OR RELATIVES?: ONCE A WEEK

## 2024-08-20 ASSESSMENT — PAIN SCALES - GENERAL: PAINLEVEL: MODERATE PAIN (5)

## 2024-08-20 NOTE — PROGRESS NOTES
Preventive Care Visit  [facility]  Lizzie Austin MD, Family Medicine  Aug 20, 2024      Assessment & Plan     Encounter for Medicare annual wellness exam    Gastroesophageal reflux disease with esophagitis without hemorrhage  Switch from omeprazole to protonix, short term sucralfate  Avoid alcohol completely for at least one month, prior to worsening symptoms was drinking with his friends  Do your best to reduce caffeine consumption  - sucralfate (CARAFATE) 1 GM tablet; Take 1 tablet (1 g) by mouth 4 times daily  - pantoprazole (PROTONIX) 20 MG EC tablet; Take 2 tablets (40 mg) by mouth daily for 60 days  - Comprehensive metabolic panel (BMP + Alb, Alk Phos, ALT, AST, Total. Bili, TP); Future  - CBC with platelets; Future  - Comprehensive metabolic panel (BMP + Alb, Alk Phos, ALT, AST, Total. Bili, TP)  - CBC with platelets    Sore throat  Sore throat starting a couple of days ago  - Streptococcus A Rapid Screen w/Reflex to PCR - Clinic Collect  - Group A Streptococcus PCR Throat Swab    CARDIOVASCULAR SCREENING; LDL GOAL LESS THAN 160  Continue crestor  The 10-year ASCVD risk score (Lyla WALLACE, et al., 2019) is: 10.8%    Values used to calculate the score:      Age: 63 years      Sex: Male      Is Non- : No      Diabetic: No      Tobacco smoker: No      Systolic Blood Pressure: 110 mmHg      Is BP treated: No      HDL Cholesterol: 57 mg/dL      Total Cholesterol: 253 mg/dL   - Lipid panel reflex to direct LDL Non-fasting; Future  - Lipid panel reflex to direct LDL Non-fasting    Multiple joint pain  Other fatigue  Chronic multiple joint pain, fatigue, intermittent chest discomfort  Previous work up includes rheumatological labs with rheumatology visit, stress test, echocardiogram, lyme disease rule out  Patient concerned it is celiac - biopsy negative, blood tests negative - due to recently diagnosed celiacs in his older brother and chronic vague symptoms.  "Discussed he could try cutting off gluten completely for at least 3 months and see if this helps his symptoms - also recommend keeping a diary/log    Hyperlipidemia LDL goal <160  As above  - rosuvastatin (CRESTOR) 10 MG tablet; Take 1 tablet (10 mg) by mouth daily            BMI  Estimated body mass index is 28.94 kg/m  as calculated from the following:    Height as of this encounter: 1.753 m (5' 9\").    Weight as of this encounter: 88.9 kg (196 lb).       Counseling  Appropriate preventive services were addressed with this patient via screening, questionnaire, or discussion as appropriate for fall prevention, nutrition, physical activity, Tobacco-use cessation, social engagement, weight loss and cognition.  Checklist reviewing preventive services available has been given to the patient.  Reviewed patient's diet, addressing concerns and/or questions.   He is at risk for lack of exercise and has been provided with information to increase physical activity for the benefit of his well-being.   The patient was instructed to see the dentist every 6 months.   He is at risk for psychosocial distress and has been provided with information to reduce risk.   Discussed possible causes of fatigue. The patient was provided with written information regarding signs of hearing loss.           Dorys Doe is a 63 year old, presenting for the following:  Physical and Medication Reconciliation (Ran out of Crestor for 1.5 months.)        8/20/2024     7:34 AM   Additional Questions   Roomed by Lara CULLEN   Accompanied by self        Health Care Directive  Patient does not have a Health Care Directive or Living Will: Discussed advance care planning with patient; information given to patient to review.    HPI              8/20/2024   General Health   How would you rate your overall physical health? (!) DECLINE   Feel stress (tense, anxious, or unable to sleep) Only a little      (!) STRESS CONCERN      8/20/2024   Nutrition   Diet: " Regular (no restrictions)            8/20/2024   Exercise   Days per week of moderate/strenous exercise 1 day      (!) EXERCISE CONCERN      8/20/2024   Social Factors   Frequency of gathering with friends or relatives Once a week   Worry food won't last until get money to buy more No   Food not last or not have enough money for food? No   Do you have housing? (Housing is defined as stable permanent housing and does not include staying ouside in a car, in a tent, in an abandoned building, in an overnight shelter, or couch-surfing.) Yes   Are you worried about losing your housing? No   Lack of transportation? No   Unable to get utilities (heat,electricity)? No            8/20/2024   Fall Risk   Fallen 2 or more times in the past year? No   Trouble with walking or balance? No             8/20/2024   Activities of Daily Living- Home Safety   Needs help with the following daily activites None of the above   Safety concerns in the home None of the above            8/20/2024   Dental   Dentist two times every year? (!) NO            8/20/2024   Hearing Screening   Hearing concerns? (!) I NEED TO ASK PEOPLE TO SPEAK UP OR REPEAT THEMSELVES.    (!) IT'S HARD TO FOLLOW A CONVERSATION IN A NOISY RESTAURANT OR CROWDED ROOM.    (!) TROUBLE UNDESTANDING A SPEAKER IN A PUBLIC MEETING OR [facility] SERVICE.    (!) TROUBLE FOLLOWING DIALOGUE IN THE THEATHER.    (!) TROUBLE UNDERSTANDING SOFT OR WHISPERED SPEECH.       Multiple values from one day are sorted in reverse-chronological order         8/20/2024   Driving Risk Screening   Patient/family members have concerns about driving No            8/20/2024   General Alertness/Fatigue Screening   Have you been more tired than usual lately? (!) YES            8/20/2024   Urinary Incontinence Screening   Bothered by leaking urine in past 6 months No            8/20/2024   TB Screening   Were you born outside of the [place]? No            Today's PHQ-2 Score:       8/20/2024     7:16 AM  "  PHQ-2 (  [employer])   Q1: Little interest or pleasure in doing things 0   Q2: Feeling down, depressed or hopeless 1   PHQ-2 Score 1   Q1: Little interest or pleasure in doing things Not at all   Q2: Feeling down, depressed or hopeless Several days   PHQ-2 Score 1           2024   Substance Use   Alcohol more than 3/day or more than 7/wk No   Do you have a current opioid prescription? No   How severe/bad is pain from 1 to 10? 4/10   Do you use any other substances recreationally? No        Social History     Tobacco Use    Smoking status: Former     Current packs/day: 0.00     Average packs/day: 1 pack/day for 20.0 years (20.0 ttl pk-yrs)     Types: Cigarettes     Start date: 2003     Quit date: 2023     Years since quittin.1    Smokeless tobacco: Never   Vaping Use    Vaping status: Never Used   Substance Use Topics    Alcohol use: Yes     Alcohol/week: 0.0 standard drinks of alcohol     Comment: 12 pack in a week    Drug use: No             2024   One time HIV Screening   Previous HIV test? No      Last PSA: No results found for: \"PSA\"  ASCVD Risk   The 10-year ASCVD risk score (Lyla WALLACE, et al., 2019) is: 10.8%    Values used to calculate the score:      Age: 63 years      Sex: Male      Is Non- : No      Diabetic: No      Tobacco smoker: No      Systolic Blood Pressure: 110 mmHg      Is BP treated: No      HDL Cholesterol: 57 mg/dL      Total Cholesterol: 253 mg/dL            Reviewed and updated as needed this visit by Provider                      Current providers sharing in care for this patient include:  Patient Care Team:  [facility] as PCP - Lizzie Estrada MD as Assigned PCP  Tucker Tucker MD as Assigned Musculoskeletal Provider    The following health maintenance items are reviewed in Epic and correct as of today:  Health Maintenance   Topic Date Due    HIV SCREENING  Never done    ZOSTER " "IMMUNIZATION (1 of 2) Never done    RSV VACCINE (Pregnancy & 60+) (1 - 1-dose 60+ series) Never done    COVID-19 Vaccine (2 - 2023-24 season) 09/01/2023    Pneumococcal Vaccine: 65+ Years (1 of 1 - PCV) Never done    AORTIC ANEURYSM SCREENING (SYSTEM ASSIGNED)  11/21/2023    INFLUENZA VACCINE (1) 09/01/2024    MEDICARE ANNUAL WELLNESS VISIT  10/03/2024    LUNG CANCER SCREENING  11/07/2024    LIPID  06/10/2025    ANNUAL REVIEW OF HM ORDERS  06/10/2025    FALL RISK ASSESSMENT  08/20/2025    GLUCOSE  10/03/2026    DTAP/TDAP/TD IMMUNIZATION (3 - Td or Tdap) 03/27/2027    ADVANCE CARE PLANNING  10/03/2028    COLORECTAL CANCER SCREENING  03/25/2034    HEPATITIS C SCREENING  Completed    PHQ-2 (once per calendar year)  Completed    IPV IMMUNIZATION  Aged Out    HPV IMMUNIZATION  Aged Out    MENINGITIS IMMUNIZATION  Aged Out    RSV MONOCLONAL ANTIBODY  Aged Out            Objective    Exam  /64   Pulse 74   Temp 97.8  F (36.6  C) (Tympanic)   Resp 16   Ht 1.753 m (5' 9\")   Wt 88.9 kg (196 lb)   SpO2 97%   BMI 28.94 kg/m     Estimated body mass index is 28.94 kg/m  as calculated from the following:    Height as of this encounter: 1.753 m (5' 9\").    Weight as of this encounter: 88.9 kg (196 lb).    Physical Exam  GENERAL: alert and no distress  NECK: no adenopathy, no asymmetry, masses, or scars  RESP: lungs clear to auscultation - no rales, rhonchi or wheezes  CV: regular rate and rhythm, normal S1 S2, no S3 or S4, no murmur, click or rub, no peripheral edema  ABDOMEN: soft, nontender, no hepatosplenomegaly, no masses and bowel sounds normal  MS: no gross musculoskeletal defects noted, no edema         8/20/2024   Mini Cog   Clock Draw Score 2 Normal   3 Item Recall 2 objects recalled   Mini Cog Total Score 4            Vision Screen  Vision Screen Results: Pass      Signed Electronically by: Lizzie Austin MD    "

## 2024-08-20 NOTE — NURSING NOTE
"Chief Complaint   Patient presents with    Physical    Medication Reconciliation     Ran out of [facility] for 1.5 months.       Initial /64   Pulse 74   Temp 97.8  F (36.6  C) (Tympanic)   Resp 16   Ht 1.753 m (5' 9\")   Wt 88.9 kg (196 lb)   SpO2 97%   BMI 28.94 kg/m   Estimated body mass index is 28.94 kg/m  as calculated from the following:    Height as of this encounter: 1.753 m (5' 9\").    Weight as of this encounter: 88.9 kg (196 lb).    Patient presents to the clinic using No DME    Is there anyone who you would like to be able to receive your results? No  If yes have patient fill out ANNE      "

## 2024-08-20 NOTE — PATIENT INSTRUCTIONS
Patient Education   Preventive Care Advice   This is general advice given by our system to help you stay healthy. However, your care team may have specific advice just for you. Please talk to your care team about your preventive care needs.  Nutrition  Eat 5 or more servings of fruits and vegetables each day.  Try wheat bread, brown rice and whole grain pasta (instead of white bread, rice, and pasta).  Get enough calcium and vitamin D. Check the label on foods and aim for 100% of the RDA (recommended daily allowance).  Lifestyle  Exercise at least 150 minutes each week  (30 minutes a day, 5 days a week).  Do muscle strengthening activities 2 days a week. These help control your weight and prevent disease.  No smoking.  Wear sunscreen to prevent skin cancer.  Have a dental exam and cleaning every 6 months.  Yearly exams  See your health care team every year to talk about:  Any changes in your health.  Any medicines your care team has prescribed.  Preventive care, family planning, and ways to prevent chronic diseases.  Shots (vaccines)   HPV shots (up to age 26), if you've never had them before.  Hepatitis B shots (up to age 59), if you've never had them before.  COVID-19 shot: Get this shot when it's due.  Flu shot: Get a flu shot every year.  Tetanus shot: Get a tetanus shot every 10 years.  Pneumococcal, hepatitis A, and RSV shots: Ask your care team if you need these based on your risk.  Shingles shot (for age 50 and up)  General health tests  Diabetes screening:  Starting at age 35, Get screened for diabetes at least every 3 years.  If you are younger than age 35, ask your care team if you should be screened for diabetes.  Cholesterol test: At age 39, start having a cholesterol test every 5 years, or more often if advised.  Bone density scan (DEXA): At age 50, ask your care team if you should have this scan for osteoporosis (brittle bones).  Hepatitis C: Get tested at least once in your life.  STIs (sexually  transmitted infections)  Before age 24: Ask your care team if you should be screened for STIs.  After age 24: Get screened for STIs if you're at risk. You are at risk for STIs (including HIV) if:  You are sexually active with more than one person.  You don't use condoms every time.  You or a partner was diagnosed with a sexually transmitted infection.  If you are at risk for HIV, ask about PrEP medicine to prevent HIV.  Get tested for HIV at least once in your life, whether you are at risk for HIV or not.  Cancer screening tests  Cervical cancer screening: If you have a cervix, begin getting regular cervical cancer screening tests starting at age 21.  Breast cancer scan (mammogram): If you've ever had breasts, begin having regular mammograms starting at age 40. This is a scan to check for breast cancer.  Colon cancer screening: It is important to start screening for colon cancer at age 45.  Have a colonoscopy test every 10 years (or more often if you're at risk) Or, ask your provider about stool tests like a FIT test every year or Cologuard test every 3 years.  To learn more about your testing options, visit:   .  For help making a decision, visit:   https://bit.ly/vu77570.  Prostate cancer screening test: If you have a prostate, ask your care team if a prostate cancer screening test (PSA) at age 55 is right for you.  Lung cancer screening: If you are a current or former smoker ages 50 to 80, ask your care team if ongoing lung cancer screenings are right for you.  For informational purposes only. Not to replace the advice of your health care provider. Copyright   2023 [facility] [employer]. All rights reserved. Clinically reviewed by the [facility]. [employer] [id number] - REV 01/24.  Hearing Loss: Care Instructions  Overview     Hearing loss is a sudden or slow decrease in how well you hear. It can range from slight to profound. Permanent hearing loss can occur with aging. It also can  happen when you are exposed long-term to loud noise. Examples include listening to loud music, riding motorcycles, or being around other loud machines.  Hearing loss can affect your work and home life. It can make you feel lonely or depressed. You may feel that you have lost your independence. But hearing aids and other devices can help you hear better and feel connected to others.  Follow-up care is a key part of your treatment and safety. Be sure to make and go to all appointments, and call your doctor if you are having problems. It's also a good idea to know your test results and keep a list of the medicines you take.  How can you care for yourself at home?  Avoid loud noises whenever possible. This helps keep your hearing from getting worse.  Always wear hearing protection around loud noises.  Wear a hearing aid as directed.  A professional can help you pick a hearing aid that will work best for you.  You can also get hearing aids over the counter for mild to moderate hearing loss.  Have hearing tests as your doctor suggests. They can show whether your hearing has changed. Your hearing aid may need to be adjusted.  Use other devices as needed. These may include:  Telephone amplifiers and hearing aids that can connect to a television, stereo, radio, or microphone.  Devices that use lights or vibrations. These alert you to the doorbell, a ringing telephone, or a baby monitor.  Television closed-captioning. This shows the words at the bottom of the screen. Most new TVs can do this.  TTY (text telephone). This lets you type messages back and forth on the telephone instead of talking or listening. These devices are also called TDD. When messages are typed on the keyboard, they are sent over the phone line to a receiving TTY. The message is shown on a monitor.  Use text messaging, social media, and email if it is hard for you to communicate by telephone.  Try to learn a listening technique called speechreading. It is  "not lipreading. You pay attention to people's gestures, expressions, posture, and tone of voice. These clues can help you understand what a person is saying. Face the person you are talking to, and have them face you. Make sure the lighting is good. You need to see the other person's face clearly.  Think about counseling if you need help to adjust to your hearing loss.  When should you call for help?  Watch closely for changes in your health, and be sure to contact your doctor if:    You think your hearing is getting worse.     You have new symptoms, such as dizziness or nausea.   Where can you learn more?  Go to https://www.[employer].net/patiented  Enter R798 in the search box to learn more about \"Hearing Loss: Care Instructions.\"  Current as of: September 27, 2023               Content Version: 14.0    3669-2269 [employer].   Care instructions adapted under license by your healthcare professional. If you have questions about a medical condition or this instruction, always ask your healthcare professional. [employer] disclaims any warranty or liability for your use of this information.      Learning About Sleeping Well  What does sleeping well mean?     Sleeping well means getting enough sleep to feel good and stay healthy. How much sleep is enough varies among people.  The number of hours you sleep and how you feel when you wake up are both important. If you do not feel refreshed, you probably need more sleep. Another sign of not getting enough sleep is feeling tired during the day.  Experts recommend that adults get at least 7 or more hours of sleep per day. Children and older adults need more sleep.  Why is getting enough sleep important?  Getting enough quality sleep is a basic part of good health. When your sleep suffers, your physical health, mood, and your thoughts can suffer too. You may find yourself feeling more grumpy or stressed. Not getting enough sleep also can lead to " "serious problems, including injury, accidents, anxiety, and depression.  What might cause poor sleeping?  Many things can cause sleep problems, including:  Changes to your sleep schedule.  Stress. Stress can be caused by fear about a single event, such as giving a speech. Or you may have ongoing stress, such as worry about work or school.  Depression, anxiety, and other mental or emotional conditions.  Changes in your sleep habits or surroundings. This includes changes that happen where you sleep, such as noise, light, or sleeping in a different bed. It also includes changes in your sleep pattern, such as having jet lag or working a late shift.  Health problems, such as pain, breathing problems, and restless legs syndrome.  Lack of regular exercise.  Using alcohol, nicotine, or caffeine before bed.  How can you help yourself?  Here are some tips that may help you sleep more soundly and wake up feeling more refreshed.  Your sleeping area   Use your bedroom only for sleeping and sex. A bit of light reading may help you fall asleep. But if it doesn't, do your reading elsewhere in the house. Try not to use your TV, computer, smartphone, or tablet while you are in bed.  Be sure your bed is big enough to stretch out comfortably, especially if you have a sleep partner.  Keep your bedroom quiet, dark, and cool. Use curtains, blinds, or a sleep mask to block out light. To block out noise, use earplugs, soothing music, or a \"white noise\" machine.  Your evening and bedtime routine   Create a relaxing bedtime routine. You might want to take a warm shower or bath, or listen to soothing music.  Go to bed at the same time every night. And get up at the same time every morning, even if you feel tired.  What to avoid   Limit caffeine (coffee, tea, caffeinated sodas) during the day, and don't have any for at least 6 hours before bedtime.  Avoid drinking alcohol before bedtime. Alcohol can cause you to wake up more often during the " "night.  Try not to smoke or use tobacco, especially in the evening. Nicotine can keep you awake.  Limit naps during the day, especially close to bedtime.  Avoid lying in bed awake for too long. If you can't fall asleep or if you wake up in the middle of the night and can't get back to sleep within about 20 minutes, get out of bed and go to another room until you feel sleepy.  Avoid taking medicine right before bed that may keep you awake or make you feel hyper or energized. Your doctor can tell you if your medicine may do this and if you can take it earlier in the day.  If you can't sleep   Imagine yourself in a peaceful, pleasant scene. Focus on the details and feelings of being in a place that is relaxing.  Get up and do a quiet or boring activity until you feel sleepy.  Avoid drinking any liquids before going to bed to help prevent waking up often to use the bathroom.  Where can you learn more?  Go to https://www.[employer].net/patiented  Enter J942 in the search box to learn more about \"Learning About Sleeping Well.\"  Current as of: July 10, 2023  Content Version: 14.1 2006-2024 [employer].   Care instructions adapted under license by your healthcare professional. If you have questions about a medical condition or this instruction, always ask your healthcare professional. [employer] disclaims any warranty or liability for your use of this information.       "

## 2024-09-06 ENCOUNTER — OFFICE VISIT (OUTPATIENT)
Dept: URGENT CARE | Facility: URGENT CARE | Age: 64
End: 2024-09-06
Payer: COMMERCIAL

## 2024-09-06 VITALS
TEMPERATURE: 97.6 F | SYSTOLIC BLOOD PRESSURE: 114 MMHG | HEART RATE: 63 BPM | DIASTOLIC BLOOD PRESSURE: 62 MMHG | OXYGEN SATURATION: 99 % | BODY MASS INDEX: 29.53 KG/M2 | WEIGHT: 200 LBS | RESPIRATION RATE: 16 BRPM

## 2024-09-06 DIAGNOSIS — R07.0 THROAT PAIN: Primary | ICD-10-CM

## 2024-09-06 LAB
DEPRECATED S PYO AG THROAT QL EIA: NEGATIVE
GROUP A STREP BY PCR: NOT DETECTED

## 2024-09-06 PROCEDURE — 87651 STREP A DNA AMP PROBE: CPT | Performed by: PHYSICIAN ASSISTANT

## 2024-09-06 PROCEDURE — 99213 OFFICE O/P EST LOW 20 MIN: CPT | Performed by: PHYSICIAN ASSISTANT

## 2024-09-06 PROCEDURE — 87635 SARS-COV-2 COVID-19 AMP PRB: CPT | Performed by: PHYSICIAN ASSISTANT

## 2024-09-06 NOTE — PROGRESS NOTES
"  Assessment & Plan     Throat pain  Rapid strep negative, COVID pending. This is likely viral. Continue with supportive care. Get plenty of rest and push fluids. Can use Tylenol and/or ibuprofen as needed for pain and/or fever control. Discussed return to school/work guidelines. Return to clinic if symptoms worsen or do not improve; otherwise follow up as needed     - Streptococcus A Rapid Screen w/Reflex to PCR - Clinic Collect  - Symptomatic COVID-19 Virus (Coronavirus) by PCR Nose  - Group A Streptococcus PCR Throat Swab          BMI  Estimated body mass index is 29.53 kg/m  as calculated from the following:    Height as of 8/20/24: 1.753 m (5' 9\").    Weight as of this encounter: 90.7 kg (200 lb).             Return in about 1 week (around 9/13/2024), or if symptoms worsen or fail to improve.            Subjective   Chief Complaint   Patient presents with    Throat Pain     X 3 weeks, did subside and came back, recently loss his sister to pancreatic cancer a month ago. He is nervous.    Fatigue     X 2 years, becoming more fatigue than usual    Mouth Problem     Back of throat is yellowish, getting cancer sore for the last 10 days on his tongue       HPI     URI Adult    Onset of symptoms was 3-4 days.  Course of illness is waxing and waning.    Severity moderate  Current and Associated symptoms: sore throat, fatigue, canker sore  Treatment measures tried include Tylenol/Ibuprofen.  Predisposing factors include None.                      Objective    /62   Pulse 63   Temp 97.6  F (36.4  C) (Tympanic)   Resp 16   Wt 90.7 kg (200 lb)   SpO2 99%   BMI 29.53 kg/m    Body mass index is 29.53 kg/m .      Physical Exam  Constitutional:       General: He is not in acute distress.     Appearance: He is well-developed.   HENT:      Head: Normocephalic and atraumatic.      Right Ear: Tympanic membrane and ear canal normal.      Left Ear: Tympanic membrane and ear canal normal.      Mouth/Throat:      Pharynx: " Posterior oropharyngeal erythema present.   Eyes:      Conjunctiva/sclera: Conjunctivae normal.   Cardiovascular:      Rate and Rhythm: Normal rate and regular rhythm.   Pulmonary:      Effort: Pulmonary effort is normal.      Breath sounds: Normal breath sounds.   Skin:     General: Skin is warm and dry.      Findings: No rash.   Psychiatric:         Behavior: Behavior normal.                    Signed Electronically by: Evelyn Sheldon PA-C

## 2024-09-07 LAB — SARS-COV-2 RNA RESP QL NAA+PROBE: NEGATIVE

## 2024-09-11 ENCOUNTER — OFFICE VISIT (OUTPATIENT)
Dept: FAMILY MEDICINE | Facility: CLINIC | Age: 64
End: 2024-09-11
Payer: COMMERCIAL

## 2024-09-11 VITALS
RESPIRATION RATE: 18 BRPM | HEIGHT: 69 IN | HEART RATE: 52 BPM | BODY MASS INDEX: 29.18 KG/M2 | SYSTOLIC BLOOD PRESSURE: 122 MMHG | WEIGHT: 197 LBS | OXYGEN SATURATION: 100 % | TEMPERATURE: 97.2 F | DIASTOLIC BLOOD PRESSURE: 70 MMHG

## 2024-09-11 DIAGNOSIS — K13.70 ORAL LESION: ICD-10-CM

## 2024-09-11 DIAGNOSIS — M25.50 MULTIPLE JOINT PAIN: ICD-10-CM

## 2024-09-11 DIAGNOSIS — R53.83 OTHER FATIGUE: Primary | ICD-10-CM

## 2024-09-11 PROCEDURE — 99214 OFFICE O/P EST MOD 30 MIN: CPT | Performed by: FAMILY MEDICINE

## 2024-09-11 ASSESSMENT — PAIN SCALES - GENERAL: PAINLEVEL: MILD PAIN (3)

## 2024-09-11 NOTE — PROGRESS NOTES
"  Assessment & Plan     Other fatigue  64 yo male who returns for chronic multiple joint pain, fatigue, malaise. Previous work up unrevealing. See hpi.   - Adult Rheumatology  Referral; Future    Multiple joint pain  As above  - Adult Rheumatology  Referral; Future    Oral lesion  Left inner mouth, small yellow lesion persistent for 1 month. Sore.  - Adult ENT  Referral; Future          BMI  Estimated body mass index is 29.09 kg/m  as calculated from the following:    Height as of this encounter: 1.753 m (5' 9\").    Weight as of this encounter: 89.4 kg (197 lb).             Dorys Doe is a 63 year old, presenting for the following health issues:  No chief complaint on file.    History of Present Illness       Reason for visit:  Follow yp on continued symptoms of sore throat,fateige ,and joint pain    He eats 0-1 servings of fruits and vegetables daily.He consumes 1 sweetened beverage(s) daily.He exercises with enough effort to increase his heart rate 9 or less minutes per day.  He exercises with enough effort to increase his heart rate 3 or less days per week.   He is taking medications regularly.    64 yo male who returns for chronic multiple joint pain, fatigue, malaise. Symptoms started in around 2016 and has been progressively worsening. Patient is generally active physically and has a good diet. There is generalized muscle aches, joint pain and stiffness including hands (main concern currently), knees, hips, shoulders. Intermittent sensation of chest tightness. Intermittent mouth ulcers. He had also complained of globus sensation, difficulty swallowing. Brother with newly diagnosed celiac disease. Sister with pancreatic cancer. Previous smoker.    Saw Dr. Wall, rheumatologist once in 2016 \"was found to have a normal CBC, negative Lyme, negative rheumatoid factor, normal Sed rate and an SAHIL of 2.0\" CCP antibody, COREY panel, double stranded DNA unremarkable.     Saw sleep " "medicine 12/2016 diagnosed with Mild to moderate JO without sleep-associated hypoxemia. \"Discussed this level of JO is not felt to have a significant increase in long-term health concerns, but can have a significant improvement in daytime sleepiness\"    Over the years has been seen for shoulder pain, left achilles tendonitis, plantar fasciitis, wrist pain. Has done physical therapy.    Upper endoscopy and colonoscopy showed gastritis, LA Grade B reflux esophagitis with no bleeding. Pathology showed focally active chronic gastritis. He has tried course of PPI.    He has also tried eliminating gluten from the diet for the past 4 weeks. His stool consistency has improved to solid, however, he still has fatigue and joint pain.      Acute Illness  Acute illness concerns: Sores on tongue, throat symptoms on going fatigue  Onset/Duration: Fatigue for a year plus.  Sores on tongue several weeks ago.  Symptoms:  Fever: No  Chills/Sweats: No  Headache (location?): No  Sinus Pressure: No  Conjunctivitis:  No  Ear Pain: better now  Rhinorrhea: No  Congestion: No  Sore Throat: YES has noted yellow pocket on tonsil   Cough: only am but not new symptom  Wheeze: No  Decreased Appetite: no  Nausea: No  Vomiting: No  Diarrhea: No  Dysuria/Freq.: No  Dysuria or Hematuria: No  Fatigue/Achiness: YES  Sick/Strep Exposure: none    Therapies tried and outcome: salt water gargle       Concern - Joint Pain  Onset: year for his shoulders     Hand pain for years getting worse  Description: Hand pain and shoulder pain. Can get pain on top of forearms also rt knee pain for months.  Intensity: mild, moderate, severe  Progression of Symptoms:  worsening  Accompanying Signs & Symptoms: see above  Previous history of similar problem: yes  Precipitating factors:        Worsened by: sleep  Alleviating factors:        Improved by: ?  Therapies tried and outcome: Ibuprofen 200 mg taking 600 mg nightly            Objective    There were no vitals taken " for this visit.  There is no height or weight on file to calculate BMI.  Physical Exam  Vitals reviewed.   Cardiovascular:      Rate and Rhythm: Normal rate and regular rhythm.   Pulmonary:      Effort: Pulmonary effort is normal.      Breath sounds: Normal breath sounds.   Neurological:      Mental Status: He is alert.                    Signed Electronically by: Lizzie Austin MD

## 2024-09-11 NOTE — NURSING NOTE
"Chief Complaint   Patient presents with    Joint Pain    Fatigue     Is worried about pancreatic cancer due to sister having pancreatic cancer.       Initial /70   Pulse 52   Temp 97.2  F (36.2  C) (Tympanic)   Resp 18   Ht 1.753 m (5' 9\")   Wt 89.4 kg (197 lb)   SpO2 100%   BMI 29.09 kg/m   Estimated body mass index is 29.09 kg/m  as calculated from the following:    Height as of this encounter: 1.753 m (5' 9\").    Weight as of this encounter: 89.4 kg (197 lb).    Patient presents to the clinic using No DME    Is there anyone who you would like to be able to receive your results? No  If yes have patient fill out ANNE      "

## 2024-10-08 NOTE — PROGRESS NOTES
Chief Complaint   Patient presents with    Mouth Problem     Oral lesion, chronic sore throat, tongue has sores that come and go. Settled down recently but has been going on for 1-2 months. Yellow spot on the tonsils that doesn't go away.     History of Present Illness   Randall J Jennissen is a 63 year old male who presents today for evaluation.  The patient presents with a lesion on the tongue and cold sore type spots. The patient has noticed for approximately few months. Symptoms are intermittent. It hasn't been changing in size. It can be painful. It is sensitive when drinking coffee. No citrus or spicy intolerance. No bleeding. Former smoker, and no history of chewing tobacco. No lumps or swollen glands in the neck.     He tells me that he developed a bad sore throat and since then he has had a yellow spot located on the left tonsil.     Denies any fevers at this point. He does feel fatigued and muscle aches.       Past Medical History  Patient Active Problem List   Diagnosis    Hyperlipidemia LDL goal <160    Allergic conjunctivitis    Non morbid obesity due to excess calories    JO (obstructive sleep apnea)    Neuropathic pain    Chronic pain of both shoulders     Current Medications     Current Outpatient Medications:     IBUPROFEN 200 MG OR TABS, 600 mg by mouth at bedtime (Patient not taking: Reported on 8/20/2024), Disp: 120, Rfl: 0    NEW MED, Umary one tab at HS (Patient not taking: Reported on 8/20/2024), Disp: , Rfl:     omeprazole (PRILOSEC) 40 MG DR capsule, Take 1 capsule (40 mg) by mouth daily (Patient not taking: Reported on 8/20/2024), Disp: 30 capsule, Rfl: 0    pantoprazole (PROTONIX) 20 MG EC tablet, Take 2 tablets (40 mg) by mouth daily for 60 days (Patient not taking: Reported on 9/11/2024), Disp: 120 tablet, Rfl: 0    rosuvastatin (CRESTOR) 10 MG tablet, Take 1 tablet (10 mg) by mouth daily (Patient not taking: Reported on 9/11/2024), Disp: 90 tablet, Rfl: 0    sucralfate (CARAFATE) 1 GM  tablet, Take 1 tablet (1 g) by mouth 4 times daily (Patient not taking: Reported on 2024), Disp: 30 tablet, Rfl: 0    Allergies  No Known Allergies    Social History   Social History     Socioeconomic History    Marital status:    Tobacco Use    Smoking status: Former     Current packs/day: 0.00     Average packs/day: 1 pack/day for 20.0 years (20.0 ttl pk-yrs)     Types: Cigarettes     Start date: 2003     Quit date: 2023     Years since quittin.3    Smokeless tobacco: Never   Vaping Use    Vaping status: Never Used   Substance and Sexual Activity    Alcohol use: Yes     Alcohol/week: 0.0 standard drinks of alcohol     Comment: 12 pack in a week    Drug use: No    Sexual activity: Yes     Partners: Female   Other Topics Concern    Parent/sibling w/ CABG, MI or angioplasty before 65F 55M? No     Social Determinants of Health     Financial Resource Strain: Low Risk  (2024)    Financial Resource Strain     Within the past 12 months, have you or your family members you live with been unable to get utilities (heat, electricity) when it was really needed?: No   Food Insecurity: Low Risk  (2024)    Food Insecurity     Within the past 12 months, did you worry that your food would run out before you got money to buy more?: No     Within the past 12 months, did the food you bought just not last and you didn t have money to get more?: No   Transportation Needs: Low Risk  (2024)    Transportation Needs     Within the past 12 months, has lack of transportation kept you from medical appointments, getting your medicines, non-medical meetings or appointments, work, or from getting things that you need?: No   Physical Activity: Unknown (2024)    Exercise Vital Sign     Days of Exercise per Week: 1 day   Stress: No Stress Concern Present (2024)    [employer] - Occupational Stress Questionnaire     Feeling of Stress : Only a little   Social Connections:  "Unknown (8/20/2024)    Social Connection and Isolation Panel [NHANES]     Frequency of Social Gatherings with Friends and Family: Once a week   Interpersonal Safety: Low Risk  (8/20/2024)    Interpersonal Safety     Do you feel physically and emotionally safe where you currently live?: Yes     Within the past 12 months, have you been hit, slapped, kicked or otherwise physically hurt by someone?: No     Within the past 12 months, have you been humiliated or emotionally abused in other ways by your partner or ex-partner?: No   Housing Stability: Low Risk  (8/20/2024)    Housing Stability     Do you have housing? : Yes     Are you worried about losing your housing?: No       Family History  Family History   Problem Relation Age of Onset    Cancer - colorectal No family hx of     Prostate Cancer No family hx of     C.A.D. No family hx of        Review of Systems  As per HPI and PMHx, otherwise 10+ comprehensive system review is negative.    Physical Exam  /68 (BP Location: Right arm, Patient Position: Sitting, Cuff Size: Adult Large)   Pulse 73   Resp 20   Ht 1.753 m (5' 9\")   Wt 92.3 kg (203 lb 6.4 oz)   SpO2 97%   BMI 30.04 kg/m    GENERAL: Patient is a pleasant, cooperative 63 year old male in no acute distress.  HEAD: Normocephalic, atraumatic.  Hair and scalp are normal.  EYES: Pupils are equal, round, reactive to light and accommodation.  Extraocular movements are intact.  The sclera nonicteric without injection.  The extraocular structures are normal.  EARS: Normal shape and symmetry.  No tenderness when palpating the mastoid or tragal areas bilaterally.  Otoscopic exam reveals no amount of cerumen bilaterally.  The bilateral tympanic membranes are round, intact without evidence of effusion, good landmarks.  No retraction, granulation, or drainage.  NOSE: Nares are patent.  Nasal mucosa is pink.  ORAL CAVITY: Dentition is in good repair.  Mucous membranes are moist.  Examination of the oral cavity " showed cheek bit marks from the teeth, erythema on the tip of the tongue (glossitis).   Tongue is mobile, protrudes to the midline.  Palate elevates symmetrically.  Tonsils are +2, yellow mucus retention cyst caused by a plugged gland.  No erythema or exudate.  No additional oral cavity or oropharyngeal masses, lesions, ulcerations, leukoplakia.  NECK: Supple, trachea is midline.  There no palpable cervical lymphadenopathy or masses bilaterally.  Palpation of the bilateral parotid and submandibular areas reveal no masses.  No thyromegaly.    NEUROLOGIC: Cranial nerves II through XII are grossly intact.  Voice is strong.  Patient is House-Brackmann I/VI bilaterally.  CARDIOVASCULAR: Extremities are warm and well-perfused.  No significant peripheral edema.  RESPIRATORY: Patient has nonlabored breathing without cough, wheeze, stridor.  PSYCHIATRIC: Patient is alert and oriented.  Mood and affect appear normal.  SKIN: Warm and dry.  No scalp, face, or neck lesions noted.      Assessment and Plan     ICD-10-CM    1. Retention cyst of tonsil  J35.8       2. Oral lesion  K13.70 Adult ENT  Referral     Anti Nuclear Xiomara IgG by IFA with Reflex     ESR: Erythrocyte sedimentation rate     Rheumatoid factor     Anti Nuclear Xiomara IgG by IFA with Reflex     ESR: Erythrocyte sedimentation rate     Rheumatoid factor      3. Chronic fatigue  R53.82 Anti Nuclear Xiomara IgG by IFA with Reflex     ESR: Erythrocyte sedimentation rate     Rheumatoid factor     Anti Nuclear Xiomara IgG by IFA with Reflex     ESR: Erythrocyte sedimentation rate     Rheumatoid factor      4. Arthralgia, unspecified joint  M25.50 Anti Nuclear Xiomara IgG by IFA with Reflex     ESR: Erythrocyte sedimentation rate     Rheumatoid factor     Anti Nuclear Xiomara IgG by IFA with Reflex     ESR: Erythrocyte sedimentation rate     Rheumatoid factor      5. Other fatigue  R53.83 Ferritin      6. Arthropathy, unspecified  M12.9 Ferritin        It was my pleasure seeing  Randall J Jennissen today in clinic. Based on my examination, the patient has a mucus retention cyst, which is caused by a plugged gland. The patient is asymptomatic regarding his tonsils, therefore at this time would not do anything. However, if he becomes asymptomatic would consider discussing  a tonsillectomy.     As for his other symptoms, he has glossitis. Considering he is also experiencing fatigue and muscle aches I am wondering if he has a inflammatory process. I did order a SAHIL, sed rate, and R-factor. When results come in I will call him to discuss. He does have a follow up with rheumatology scheduled.     ADDENDUM NOTE:   After reviewing lab work, I feel the patient's symptoms of mild glossitis is related to a generalized inflammatory process. His clinical fatigue/joint pain/mild glossitis indicate a rheumatological process. Called the patient with results and sent PCP a message.       TYLER Clayton CNP

## 2024-10-09 ENCOUNTER — OFFICE VISIT (OUTPATIENT)
Dept: OTOLARYNGOLOGY | Facility: CLINIC | Age: 64
End: 2024-10-09
Attending: FAMILY MEDICINE
Payer: COMMERCIAL

## 2024-10-09 VITALS
HEIGHT: 69 IN | WEIGHT: 203.4 LBS | HEART RATE: 73 BPM | SYSTOLIC BLOOD PRESSURE: 118 MMHG | BODY MASS INDEX: 30.13 KG/M2 | DIASTOLIC BLOOD PRESSURE: 68 MMHG | OXYGEN SATURATION: 97 % | RESPIRATION RATE: 20 BRPM

## 2024-10-09 DIAGNOSIS — R79.89 ELEVATED FERRITIN: ICD-10-CM

## 2024-10-09 DIAGNOSIS — J35.8 RETENTION CYST OF TONSIL: Primary | ICD-10-CM

## 2024-10-09 DIAGNOSIS — K13.70 ORAL LESION: ICD-10-CM

## 2024-10-09 DIAGNOSIS — M25.50 ARTHRALGIA, UNSPECIFIED JOINT: ICD-10-CM

## 2024-10-09 DIAGNOSIS — R53.83 OTHER FATIGUE: ICD-10-CM

## 2024-10-09 DIAGNOSIS — R53.82 CHRONIC FATIGUE: ICD-10-CM

## 2024-10-09 DIAGNOSIS — M12.9 ARTHROPATHY, UNSPECIFIED: ICD-10-CM

## 2024-10-09 LAB
ERYTHROCYTE [SEDIMENTATION RATE] IN BLOOD BY WESTERGREN METHOD: 11 MM/HR (ref 0–20)
FERRITIN SERPL-MCNC: 636 NG/ML (ref 31–409)
RHEUMATOID FACT SERPL-ACNC: <10 IU/ML
TSH SERPL DL<=0.005 MIU/L-ACNC: 2.05 UIU/ML (ref 0.3–4.2)
VIT B12 SERPL-MCNC: 387 PG/ML (ref 232–1245)

## 2024-10-09 PROCEDURE — 85652 RBC SED RATE AUTOMATED: CPT

## 2024-10-09 PROCEDURE — 36415 COLL VENOUS BLD VENIPUNCTURE: CPT

## 2024-10-09 PROCEDURE — 82728 ASSAY OF FERRITIN: CPT

## 2024-10-09 PROCEDURE — 99203 OFFICE O/P NEW LOW 30 MIN: CPT

## 2024-10-09 PROCEDURE — 86431 RHEUMATOID FACTOR QUANT: CPT

## 2024-10-09 PROCEDURE — 86039 ANTINUCLEAR ANTIBODIES (ANA): CPT

## 2024-10-09 PROCEDURE — 86038 ANTINUCLEAR ANTIBODIES: CPT

## 2024-10-09 ASSESSMENT — PAIN SCALES - GENERAL: PAINLEVEL: NO PAIN (0)

## 2024-10-09 NOTE — PATIENT INSTRUCTIONS
You were seen by Wendi Abrams CNP.  If you have questions or concerns regarding your appointment today, you can reach out to our call center at [phone].  The following has been recommended at your appointment today:    You have a mucus retention cyst on your left tonsil. What this means is there is a gland that is plugged. If you are not having tonsil pain or discomfort. Any fevers or other symptoms related to the tonsil. Then we would not do anything with the cyst. If it becomes bothersome, then we would remove it.   The sores in your mouth/tongue may be related to an inflammatory process, which could also be related to joint pain and fatigue. You should follow up with a rheumatologist (keep the appointment).   I will call you with the last two labs to discuss and answer additional questions.

## 2024-10-09 NOTE — NURSING NOTE
"Chief Complaint   Patient presents with    Mouth Problem     Oral lesion, chronic sore throat, tongue has sores that come and go. Settled down recently but has been going on for 1-2 months. Yellow spot on the tonsils that doesn't go away.       Vitals:    10/09/24 0943   BP: 118/68   BP Location: Right arm   Patient Position: Sitting   Cuff Size: Adult Large   Pulse: 73   Resp: 20   SpO2: 97%   Weight: 92.3 kg (203 lb 6.4 oz)   Height: 1.753 m (5' 9\")     Wt Readings from Last 1 Encounters:   10/09/24 92.3 kg (203 lb 6.4 oz)       Tana KNOX CMA.................10/9/2024    "

## 2024-10-09 NOTE — LETTER
10/9/2024      Randall J Jennissen  [address] [facility] [address]      Dear Colleague,    Thank you for referring your patient, Randall J Jennissen, to the [facility]. Please see a copy of my visit note below.    Chief Complaint   Patient presents with     Mouth Problem     Oral lesion, chronic sore throat, tongue has sores that come and go. Settled down recently but has been going on for 1-2 months. Yellow spot on the tonsils that doesn't go away.     History of Present Illness   Randall J Jennissen is a 63 year old male who presents today for evaluation.  The patient presents with a lesion on the tongue and cold sore type spots. The patient has noticed for approximately few months. Symptoms are intermittent. It hasn't been changing in size. It can be painful. It is sensitive when drinking coffee. No citrus or spicy intolerance. No bleeding. Former smoker, and no history of chewing tobacco. No lumps or swollen glands in the neck.     He tells me that he developed a bad sore throat and since then he has had a yellow spot located on the left tonsil.     Denies any fevers at this point. He does feel fatigued and muscle aches.       Past Medical History  Patient Active Problem List   Diagnosis     Hyperlipidemia LDL goal <160     Allergic conjunctivitis     Non morbid obesity due to excess calories     JO (obstructive sleep apnea)     Neuropathic pain     Chronic pain of both shoulders     Current Medications     Current Outpatient Medications:      IBUPROFEN 200 MG OR TABS, 600 mg by mouth at bedtime (Patient not taking: Reported on 8/20/2024), Disp: 120, Rfl: 0     NEW MED, Umary one tab at HS (Patient not taking: Reported on 8/20/2024), Disp: , Rfl:      omeprazole (PRILOSEC) 40 MG DR capsule, Take 1 capsule (40 mg) by mouth daily (Patient not taking: Reported on 8/20/2024), Disp: 30 capsule, Rfl: 0     pantoprazole (PROTONIX) 20 MG EC tablet, Take 2 tablets (40 mg) by mouth  daily for 60 days (Patient not taking: Reported on 2024), Disp: 120 tablet, Rfl: 0     rosuvastatin (CRESTOR) 10 MG tablet, Take 1 tablet (10 mg) by mouth daily (Patient not taking: Reported on 2024), Disp: 90 tablet, Rfl: 0     sucralfate (CARAFATE) 1 GM tablet, Take 1 tablet (1 g) by mouth 4 times daily (Patient not taking: Reported on 2024), Disp: 30 tablet, Rfl: 0    Allergies  No Known Allergies    Social History   Social History     Socioeconomic History     Marital status:    Tobacco Use     Smoking status: Former     Current packs/day: 0.00     Average packs/day: 1 pack/day for 20.0 years (20.0 ttl pk-yrs)     Types: Cigarettes     Start date: 2003     Quit date: 2023     Years since quittin.3     Smokeless tobacco: Never   Vaping Use     Vaping status: Never Used   Substance and Sexual Activity     Alcohol use: Yes     Alcohol/week: 0.0 standard drinks of alcohol     Comment: 12 pack in a week     Drug use: No     Sexual activity: Yes     Partners: Female   Other Topics Concern     Parent/sibling w/ CABG, MI or angioplasty before 65F 55M? No     Social Determinants of Health     Financial Resource Strain: Low Risk  (2024)    Financial Resource Strain      Within the past 12 months, have you or your family members you live with been unable to get utilities (heat, electricity) when it was really needed?: No   Food Insecurity: Low Risk  (2024)    Food Insecurity      Within the past 12 months, did you worry that your food would run out before you got money to buy more?: No      Within the past 12 months, did the food you bought just not last and you didn t have money to get more?: No   Transportation Needs: Low Risk  (2024)    Transportation Needs      Within the past 12 months, has lack of transportation kept you from medical appointments, getting your medicines, non-medical meetings or appointments, work, or from getting things that you need?: No   Physical  "Activity: Unknown (8/20/2024)    Exercise Vital Sign      Days of Exercise per Week: 1 day   Stress: No Stress Concern Present (8/20/2024)    [employer] [place] [employer] - Occupational Stress Questionnaire      Feeling of Stress : Only a little   Social Connections: Unknown (8/20/2024)    Social Connection and Isolation Panel [NHANES]      Frequency of Social Gatherings with Friends and Family: Once a week   Interpersonal Safety: Low Risk  (8/20/2024)    Interpersonal Safety      Do you feel physically and emotionally safe where you currently live?: Yes      Within the past 12 months, have you been hit, slapped, kicked or otherwise physically hurt by someone?: No      Within the past 12 months, have you been humiliated or emotionally abused in other ways by your partner or ex-partner?: No   Housing Stability: Low Risk  (8/20/2024)    Housing Stability      Do you have housing? : Yes      Are you worried about losing your housing?: No       Family History  Family History   Problem Relation Age of Onset     Cancer - colorectal No family hx of      Prostate Cancer No family hx of      C.A.D. No family hx of        Review of Systems  As per HPI and PMHx, otherwise 10+ comprehensive system review is negative.    Physical Exam  /68 (BP Location: Right arm, Patient Position: Sitting, Cuff Size: Adult Large)   Pulse 73   Resp 20   Ht 1.753 m (5' 9\")   Wt 92.3 kg (203 lb 6.4 oz)   SpO2 97%   BMI 30.04 kg/m    GENERAL: Patient is a pleasant, cooperative 63 year old male in no acute distress.  HEAD: Normocephalic, atraumatic.  Hair and scalp are normal.  EYES: Pupils are equal, round, reactive to light and accommodation.  Extraocular movements are intact.  The sclera nonicteric without injection.  The extraocular structures are normal.  EARS: Normal shape and symmetry.  No tenderness when palpating the mastoid or tragal areas bilaterally.  Otoscopic exam reveals no amount of cerumen bilaterally.  The " bilateral tympanic membranes are round, intact without evidence of effusion, good landmarks.  No retraction, granulation, or drainage.  NOSE: Nares are patent.  Nasal mucosa is pink.  ORAL CAVITY: Dentition is in good repair.  Mucous membranes are moist.  Examination of the oral cavity showed cheek bit marks from the teeth, erythema on the tip of the tongue (glossitis).   Tongue is mobile, protrudes to the midline.  Palate elevates symmetrically.  Tonsils are +2, yellow mucus retention cyst caused by a plugged gland.  No erythema or exudate.  No additional oral cavity or oropharyngeal masses, lesions, ulcerations, leukoplakia.  NECK: Supple, trachea is midline.  There no palpable cervical lymphadenopathy or masses bilaterally.  Palpation of the bilateral parotid and submandibular areas reveal no masses.  No thyromegaly.    NEUROLOGIC: Cranial nerves II through XII are grossly intact.  Voice is strong.  Patient is House-Brackmann I/VI bilaterally.  CARDIOVASCULAR: Extremities are warm and well-perfused.  No significant peripheral edema.  RESPIRATORY: Patient has nonlabored breathing without cough, wheeze, stridor.  PSYCHIATRIC: Patient is alert and oriented.  Mood and affect appear normal.  SKIN: Warm and dry.  No scalp, face, or neck lesions noted.      Assessment and Plan     ICD-10-CM    1. Retention cyst of tonsil  J35.8       2. Oral lesion  K13.70 Adult ENT  Referral     Anti Nuclear Xiomara IgG by IFA with Reflex     ESR: Erythrocyte sedimentation rate     Rheumatoid factor     Anti Nuclear Xiomara IgG by IFA with Reflex     ESR: Erythrocyte sedimentation rate     Rheumatoid factor      3. Chronic fatigue  R53.82 Anti Nuclear Xiomara IgG by IFA with Reflex     ESR: Erythrocyte sedimentation rate     Rheumatoid factor     Anti Nuclear Xiomara IgG by IFA with Reflex     ESR: Erythrocyte sedimentation rate     Rheumatoid factor      4. Arthralgia, unspecified joint  M25.50 Anti Nuclear Xiomara IgG by IFA with Reflex      ESR: Erythrocyte sedimentation rate     Rheumatoid factor     Anti Nuclear Xiomara IgG by IFA with Reflex     ESR: Erythrocyte sedimentation rate     Rheumatoid factor      5. Other fatigue  R53.83 Ferritin      6. Arthropathy, unspecified  M12.9 Ferritin        It was my pleasure seeing Randall J Jennissen today in clinic. Based on my examination, the patient has a mucus retention cyst, which is caused by a plugged gland. The patient is asymptomatic regarding his tonsils, therefore at this time would not do anything. However, if he becomes asymptomatic would consider discussing  a tonsillectomy.     As for his other symptoms, he has glossitis. Considering he is also experiencing fatigue and muscle aches I am wondering if he has a inflammatory process. I did order a SAHIL, sed rate, and R-factor. When results come in I will call him to discuss. He does have a follow up with rheumatology scheduled.       TYLER Clayton CNP      Again, thank you for allowing me to participate in the care of your patient.        Sincerely,        TYLER Clayton CNP

## 2024-10-10 LAB
ANA PAT SER IF-IMP: ABNORMAL
ANA SER QL IF: ABNORMAL
ANA TITR SER IF: ABNORMAL {TITER}

## 2025-02-11 ENCOUNTER — OFFICE VISIT (OUTPATIENT)
Dept: FAMILY MEDICINE | Facility: CLINIC | Age: 65
End: 2025-02-11
Payer: COMMERCIAL

## 2025-02-11 VITALS
RESPIRATION RATE: 18 BRPM | HEART RATE: 62 BPM | DIASTOLIC BLOOD PRESSURE: 70 MMHG | TEMPERATURE: 97.6 F | SYSTOLIC BLOOD PRESSURE: 110 MMHG | WEIGHT: 199 LBS | BODY MASS INDEX: 29.47 KG/M2 | HEIGHT: 69 IN | OXYGEN SATURATION: 97 %

## 2025-02-11 DIAGNOSIS — Z13.6 SCREENING FOR AAA (ABDOMINAL AORTIC ANEURYSM): ICD-10-CM

## 2025-02-11 DIAGNOSIS — Z00.00 ENCOUNTER FOR MEDICARE ANNUAL WELLNESS EXAM: Primary | ICD-10-CM

## 2025-02-11 DIAGNOSIS — Z12.5 SCREENING FOR PROSTATE CANCER: ICD-10-CM

## 2025-02-11 DIAGNOSIS — R07.89 ATYPICAL CHEST PAIN: ICD-10-CM

## 2025-02-11 DIAGNOSIS — E78.5 HYPERLIPIDEMIA LDL GOAL <160: ICD-10-CM

## 2025-02-11 DIAGNOSIS — R07.89 ATYPICAL CHEST PAIN: Primary | ICD-10-CM

## 2025-02-11 DIAGNOSIS — Z87.891 PERSONAL HISTORY OF TOBACCO USE: ICD-10-CM

## 2025-02-11 DIAGNOSIS — Z87.891 HISTORY OF SMOKING: ICD-10-CM

## 2025-02-11 LAB
ALBUMIN SERPL BCG-MCNC: 4.2 G/DL (ref 3.5–5.2)
ALP SERPL-CCNC: 61 U/L (ref 40–150)
ALT SERPL W P-5'-P-CCNC: 26 U/L (ref 0–70)
ANION GAP SERPL CALCULATED.3IONS-SCNC: 12 MMOL/L (ref 7–15)
AST SERPL W P-5'-P-CCNC: 32 U/L (ref 0–45)
BILIRUB SERPL-MCNC: 0.4 MG/DL
BUN SERPL-MCNC: 17.7 MG/DL (ref 8–23)
CALCIUM SERPL-MCNC: 9.2 MG/DL (ref 8.8–10.4)
CHLORIDE SERPL-SCNC: 106 MMOL/L (ref 98–107)
CREAT SERPL-MCNC: 1 MG/DL (ref 0.67–1.17)
EGFRCR SERPLBLD CKD-EPI 2021: 83 ML/MIN/1.73M2
ERYTHROCYTE [DISTWIDTH] IN BLOOD BY AUTOMATED COUNT: 12.8 % (ref 10–15)
GLUCOSE SERPL-MCNC: 133 MG/DL (ref 70–99)
HCO3 SERPL-SCNC: 23 MMOL/L (ref 22–29)
HCT VFR BLD AUTO: 40.5 % (ref 40–53)
HGB BLD-MCNC: 13.5 G/DL (ref 13.3–17.7)
MCH RBC QN AUTO: 30.7 PG (ref 26.5–33)
MCHC RBC AUTO-ENTMCNC: 33.3 G/DL (ref 31.5–36.5)
MCV RBC AUTO: 92 FL (ref 78–100)
PLATELET # BLD AUTO: 230 10E3/UL (ref 150–450)
POTASSIUM SERPL-SCNC: 4.1 MMOL/L (ref 3.4–5.3)
PROT SERPL-MCNC: 6.6 G/DL (ref 6.4–8.3)
PSA SERPL DL<=0.01 NG/ML-MCNC: 1.11 NG/ML (ref 0–4.5)
RBC # BLD AUTO: 4.4 10E6/UL (ref 4.4–5.9)
SODIUM SERPL-SCNC: 141 MMOL/L (ref 135–145)
WBC # BLD AUTO: 3.7 10E3/UL (ref 4–11)

## 2025-02-11 PROCEDURE — 99214 OFFICE O/P EST MOD 30 MIN: CPT | Mod: 25 | Performed by: FAMILY MEDICINE

## 2025-02-11 PROCEDURE — G0296 VISIT TO DETERM LDCT ELIG: HCPCS | Performed by: FAMILY MEDICINE

## 2025-02-11 PROCEDURE — 90677 PCV20 VACCINE IM: CPT | Performed by: FAMILY MEDICINE

## 2025-02-11 PROCEDURE — G0103 PSA SCREENING: HCPCS | Performed by: FAMILY MEDICINE

## 2025-02-11 PROCEDURE — G0009 ADMIN PNEUMOCOCCAL VACCINE: HCPCS | Performed by: FAMILY MEDICINE

## 2025-02-11 PROCEDURE — G0438 PPPS, INITIAL VISIT: HCPCS | Performed by: FAMILY MEDICINE

## 2025-02-11 RX ORDER — CELECOXIB 100 MG/1
100 CAPSULE ORAL 2 TIMES DAILY
COMMUNITY
Start: 2025-02-03

## 2025-02-11 SDOH — HEALTH STABILITY: PHYSICAL HEALTH: ON AVERAGE, HOW MANY DAYS PER WEEK DO YOU ENGAGE IN MODERATE TO STRENUOUS EXERCISE (LIKE A BRISK WALK)?: 0 DAYS

## 2025-02-11 ASSESSMENT — SOCIAL DETERMINANTS OF HEALTH (SDOH): HOW OFTEN DO YOU GET TOGETHER WITH FRIENDS OR RELATIVES?: ONCE A WEEK

## 2025-02-11 NOTE — NURSING NOTE
"Chief Complaint   Patient presents with    Physical       Initial /70   Pulse 62   Temp 97.6  F (36.4  C)   Resp 18   Ht 1.753 m (5' 9\")   Wt 90.3 kg (199 lb)   SpO2 97%   BMI 29.39 kg/m   Estimated body mass index is 29.39 kg/m  as calculated from the following:    Height as of this encounter: 1.753 m (5' 9\").    Weight as of this encounter: 90.3 kg (199 lb).    Patient presents to the clinic using No DME    Is there anyone who you would like to be able to receive your results? No  If yes have patient fill out ANNE      "

## 2025-02-11 NOTE — PROGRESS NOTES
"Preventive Care Visit  [facility]  Lizzie Austin MD, Family Medicine  Feb 11, 2025      Assessment & Plan     Encounter for Medicare annual wellness exam    Hyperlipidemia LDL goal <160  Reviewed lipid panel 5 months ago wnl  Defer today    Screening for prostate cancer  Shared decision making  - PSA, screen; Future  - PSA, screen    Atypical chest pain  Previous work up for intermittent chest pain including unremarkable exercise stress test in June of 2024. Patient continues to experience intermittent 1-2 mins of left sided chest pain. Referral placed to cardiology for further evaluation  - Adult Cardiology Eval  Referral; Future    Personal history of tobacco use  - Prof fee: Shared Decision Making for Lung Cancer Screening  - CT Chest Lung Cancer Scrn Low Dose wo; Future    Screening for AAA (abdominal aortic aneurysm)  - Abdominal Aortic Aneurysm Screening/Tracking  - US Abdominal Aorta Imaging; Future    History of smoking  - Abdominal Aortic Aneurysm Screening/Tracking            BMI  Estimated body mass index is 29.39 kg/m  as calculated from the following:    Height as of this encounter: 1.753 m (5' 9\").    Weight as of this encounter: 90.3 kg (199 lb).       Counseling  Appropriate preventive services were addressed with this patient via screening, questionnaire, or discussion as appropriate for fall prevention, nutrition, physical activity, Tobacco-use cessation, social engagement, weight loss and cognition.  Checklist reviewing preventive services available has been given to the patient.  Reviewed patient's diet, addressing concerns and/or questions.   The patient was instructed to see the dentist every 6 months.   The patient was provided with written information regarding signs of hearing lossXi Doe is a 64 year old, presenting for the following:  Physical        2/11/2025     6:48 AM   Additional Questions   Roomed by Lara Mello " by self           HPI  Still gets atypical chest pain left side lasts a minute or 2 random  Around once a month  May have a couple of episodes within a couple of days or sometimes can go far in between          Health Care Directive  Patient does not have a Health Care Directive: Discussed advance care planning with patient; however, patient declined at this time.      2/11/2025   General Health   How would you rate your overall physical health? Good   Feel stress (tense, anxious, or unable to sleep) Only a little   (!) STRESS CONCERN      2/11/2025   Nutrition   Diet: Regular (no restrictions)         2/11/2025   Exercise   Days per week of moderate/strenous exercise 0 days   (!) EXERCISE CONCERN      2/11/2025   Social Factors   Frequency of gathering with friends or relatives Once a week   Worry food won't last until get money to buy more No   Food not last or not have enough money for food? No   Do you have housing? (Housing is defined as stable permanent housing and does not include staying ouside in a car, in a tent, in an abandoned building, in an overnight shelter, or couch-surfing.) Yes   Are you worried about losing your housing? No   Lack of transportation? No   Unable to get utilities (heat,electricity)? No         2/11/2025   Fall Risk   Fallen 2 or more times in the past year? No   Trouble with walking or balance? No          2/11/2025   Activities of Daily Living- Home Safety   Needs help with the following daily activites None of the above   Safety concerns in the home None of the above         2/11/2025   Dental   Dentist two times every year? (!) NO         2/11/2025   Hearing Screening   Hearing concerns? (!) I FEEL THAT PEOPLE ARE MUMBLING OR NOT SPEAKING CLEARLY.    (!) I NEED TO ASK PEOPLE TO SPEAK UP OR REPEAT THEMSELVES.    (!) IT'S HARDER TO UNDERSTAND WOMEN'S VOICES THAN MEN'S VOICES.    (!) IT'S HARD TO FOLLOW A CONVERSATION IN A NOISY RESTAURANT OR CROWDED ROOM.    (!) TROUBLE  "UNDESTANDING A SPEAKER IN A PUBLIC MEETING OR [employer] SERVICE.    (!) TROUBLE UNDERSTANDING SOFT OR WHISPERED SPEECH.       Multiple values from one day are sorted in reverse-chronological order         2025   Driving Risk Screening   Patient/family members have concerns about driving No         2025   General Alertness/Fatigue Screening   Have you been more tired than usual lately? No         2025   Urinary Incontinence Screening   Bothered by leaking urine in past 6 months No         2024   TB Screening   Were you born outside of the [place]? No         Today's PHQ-2 Score:       2025     6:50 AM   PHQ-2 (  [employer])   Q1: Little interest or pleasure in doing things 0   Q2: Feeling down, depressed or hopeless 0   PHQ-2 Score 0    Q1: Little interest or pleasure in doing things Not at all   Q2: Feeling down, depressed or hopeless Not at all   PHQ-2 Score 0       Patient-reported           2025   Substance Use   Alcohol more than 3/day or more than 7/wk No   Do you have a current opioid prescription? No   How severe/bad is pain from 1 to 10? 3/10   Do you use any other substances recreationally? No     Social History     Tobacco Use    Smoking status: Former     Current packs/day: 0.00     Average packs/day: 1 pack/day for 20.0 years (20.0 ttl pk-yrs)     Types: Cigarettes     Start date: 2003     Quit date: 2023     Years since quittin.6    Smokeless tobacco: Never   Vaping Use    Vaping status: Never Used   Substance Use Topics    Alcohol use: Yes     Alcohol/week: 0.0 standard drinks of alcohol     Comment: 12 pack in a week    Drug use: No       Last PSA: No results found for: \"PSA\"  ASCVD Risk   The 10-year ASCVD risk score (Lyla DK, et al., 2019) is: 9.1%    Values used to calculate the score:      Age: 64 years      Sex: Male      Is Non- : No      Diabetic: No      Tobacco smoker: No      Systolic Blood Pressure: 110 mmHg      Is " "BP treated: No      HDL Cholesterol: 54 mg/dL      Total Cholesterol: 167 mg/dL            Reviewed and updated as needed this visit by Provider                      Current providers sharing in care for this patient include:  Patient Care Team:  Clinic - [facility] as PCP - General  Lizzie Austin MD as Assigned PCP  Tucker Tucker MD as Assigned Musculoskeletal Provider  Wendi Abrams APRN CNP as Nurse Practitioner (Otolaryngology)  Lizzie Austin MD as Referring Physician (Family Medicine)  Clarita Crowder PA-C as Physician Assistant (Rheumatology)  Wendi Abrams APRN CNP as Assigned Surgical Provider    The following health maintenance items are reviewed in Epic and correct as of today:  Health Maintenance   Topic Date Due    Pneumococcal Vaccine: 50+ Years (1 of 1 - PCV) Never done    ZOSTER IMMUNIZATION (1 of 2) Never done    RSV VACCINE (1 - Risk 60-74 years 1-dose series) Never done    AORTIC ANEURYSM SCREENING (SYSTEM ASSIGNED)  11/21/2023    INFLUENZA VACCINE (1) 09/01/2024    COVID-19 Vaccine (2 - 2024-25 season) 09/01/2024    LUNG CANCER SCREENING  11/07/2024    ANNUAL REVIEW OF HM ORDERS  06/10/2025    MEDICARE ANNUAL WELLNESS VISIT  08/20/2025    LIPID  08/20/2025    FALL RISK ASSESSMENT  02/11/2026    DTAP/TDAP/TD IMMUNIZATION (3 - Td or Tdap) 03/27/2027    GLUCOSE  08/20/2027    ADVANCE CARE PLANNING  08/20/2029    COLORECTAL CANCER SCREENING  03/25/2034    HEPATITIS C SCREENING  Completed    PHQ-2 (once per calendar year)  Completed    HPV IMMUNIZATION  Aged Out    MENINGITIS IMMUNIZATION  Aged Out            Objective    Exam  /70   Pulse 62   Temp 97.6  F (36.4  C)   Resp 18   Ht 1.753 m (5' 9\")   Wt 90.3 kg (199 lb)   SpO2 97%   BMI 29.39 kg/m     Estimated body mass index is 29.39 kg/m  as calculated from the following:    Height as of this encounter: 1.753 m (5' 9\").    Weight as of this encounter: 90.3 kg (199 lb).    Physical Exam           " 2/11/2025   Mini Cog   Clock Draw Score 2 Normal   3 Item Recall 3 objects recalled   Mini Cog Total Score 5             8/20/2024   Vision Screen   Vision Screen Results Pass       Signed Electronically by: Lizzie Austin MD  Lung Cancer Screening Shared Decision Making Visit     Randall J Jennissen, a 64 year old male, is eligible for lung cancer screening    History   Smoking Status    Former    Packs/day: 1.00    Years: 20.00    Types: Cigarettes    Quit date: 6/12/2023   Smokeless Tobacco    Never       I have discussed with patient the risks and benefits of screening for lung cancer with low-dose CT.     The risks include:    radiation exposure: one low dose chest CT has as much ionizing radiation as about 15 chest x-rays, or 6 months of background radiation living in [place]      false positives: most findings/nodules are NOT cancer, but some might still require additional diagnostic evaluation, including biopsy    over-diagnosis: some slow growing cancers that might never have been clinically significant will be detected and treated unnecessarily     The benefit of early detection of lung cancer is contingent upon adherence to annual screening or more frequent follow up if indicated.     Furthermore, to benefit from screening, Carmelo must be willing and able to undergo diagnostic procedures, if indicated. Although no specific guide is available for determining severity of comorbidities, it is reasonable to withhold screening in patients who have greater mortality risk from other diseases.     We did discuss that the best way to prevent lung cancer is to not smoke.    Some patients may value a numeric estimation of lung cancer risk when evaluating if lung cancer screening is right for them, here is one calculator:    ShouldIScreen

## 2025-02-11 NOTE — PATIENT INSTRUCTIONS
Patient Education   Preventive Care Advice   This is general advice given by our system to help you stay healthy. However, your care team may have specific advice just for you. Please talk to your care team about your preventive care needs.  Nutrition  Eat 5 or more servings of fruits and vegetables each day.  Try wheat bread, brown rice and whole grain pasta (instead of white bread, rice, and pasta).  Get enough calcium and vitamin D. Check the label on foods and aim for 100% of the RDA (recommended daily allowance).  Lifestyle  Exercise at least 150 minutes each week  (30 minutes a day, 5 days a week).  Do muscle strengthening activities 2 days a week. These help control your weight and prevent disease.  No smoking.  Wear sunscreen to prevent skin cancer.  Have a dental exam and cleaning every 6 months.  Yearly exams  See your health care team every year to talk about:  Any changes in your health.  Any medicines your care team has prescribed.  Preventive care, family planning, and ways to prevent chronic diseases.  Shots (vaccines)   HPV shots (up to age 26), if you've never had them before.  Hepatitis B shots (up to age 59), if you've never had them before.  COVID-19 shot: Get this shot when it's due.  Flu shot: Get a flu shot every year.  Tetanus shot: Get a tetanus shot every 10 years.  Pneumococcal, hepatitis A, and RSV shots: Ask your care team if you need these based on your risk.  Shingles shot (for age 50 and up)  General health tests  Diabetes screening:  Starting at age 35, Get screened for diabetes at least every 3 years.  If you are younger than age 35, ask your care team if you should be screened for diabetes.  Cholesterol test: At age 39, start having a cholesterol test every 5 years, or more often if advised.  Bone density scan (DEXA): At age 50, ask your care team if you should have this scan for osteoporosis (brittle bones).  Hepatitis C: Get tested at least once in your life.  STIs (sexually  transmitted infections)  Before age 24: Ask your care team if you should be screened for STIs.  After age 24: Get screened for STIs if you're at risk. You are at risk for STIs (including HIV) if:  You are sexually active with more than one person.  You don't use condoms every time.  You or a partner was diagnosed with a sexually transmitted infection.  If you are at risk for HIV, ask about PrEP medicine to prevent HIV.  Get tested for HIV at least once in your life, whether you are at risk for HIV or not.  Cancer screening tests  Cervical cancer screening: If you have a cervix, begin getting regular cervical cancer screening tests starting at age 21.  Breast cancer scan (mammogram): If you've ever had breasts, begin having regular mammograms starting at age 40. This is a scan to check for breast cancer.  Colon cancer screening: It is important to start screening for colon cancer at age 45.  Have a colonoscopy test every 10 years (or more often if you're at risk) Or, ask your provider about stool tests like a FIT test every year or Cologuard test every 3 years.  To learn more about your testing options, visit:   .  For help making a decision, visit:   https://bit.ly/qc73619.  Prostate cancer screening test: If you have a prostate, ask your care team if a prostate cancer screening test (PSA) at age 55 is right for you.  Lung cancer screening: If you are a current or former smoker ages 50 to 80, ask your care team if ongoing lung cancer screenings are right for you.  For informational purposes only. Not to replace the advice of your health care provider. Copyright   2023 [facility] [employer]. All rights reserved. Clinically reviewed by the [facility] Transitions Program. [employer] [id number] - REV 01/24.  Hearing Loss: Care Instructions  Overview     Hearing loss is a sudden or slow decrease in how well you hear. It can range from slight to profound. Permanent hearing loss can occur with aging. It also can  happen when you are exposed long-term to loud noise. Examples include listening to loud music, riding motorcycles, or being around other loud machines.  Hearing loss can affect your work and home life. It can make you feel lonely or depressed. You may feel that you have lost your independence. But hearing aids and other devices can help you hear better and feel connected to others.  Follow-up care is a key part of your treatment and safety. Be sure to make and go to all appointments, and call your doctor if you are having problems. It's also a good idea to know your test results and keep a list of the medicines you take.  How can you care for yourself at home?  Avoid loud noises whenever possible. This helps keep your hearing from getting worse.  Always wear hearing protection around loud noises.  Wear a hearing aid as directed.  A professional can help you pick a hearing aid that will work best for you.  You can also get hearing aids over the counter for mild to moderate hearing loss.  Have hearing tests as your doctor suggests. They can show whether your hearing has changed. Your hearing aid may need to be adjusted.  Use other devices as needed. These may include:  Telephone amplifiers and hearing aids that can connect to a television, stereo, radio, or microphone.  Devices that use lights or vibrations. These alert you to the doorbell, a ringing telephone, or a baby monitor.  Television closed-captioning. This shows the words at the bottom of the screen. Most new TVs can do this.  TTY (text telephone). This lets you type messages back and forth on the telephone instead of talking or listening. These devices are also called TDD. When messages are typed on the keyboard, they are sent over the phone line to a receiving TTY. The message is shown on a monitor.  Use text messaging, social media, and email if it is hard for you to communicate by telephone.  Try to learn a listening technique called speechreading. It is  "not lipreading. You pay attention to people's gestures, expressions, posture, and tone of voice. These clues can help you understand what a person is saying. Face the person you are talking to, and have them face you. Make sure the lighting is good. You need to see the other person's face clearly.  Think about counseling if you need help to adjust to your hearing loss.  When should you call for help?  Watch closely for changes in your health, and be sure to contact your doctor if:    You think your hearing is getting worse.     You have new symptoms, such as dizziness or nausea.   Where can you learn more?  Go to https://www.[employer].net/patiented  Enter R798 in the search box to learn more about \"Hearing Loss: Care Instructions.\"  Current as of: September 27, 2023  Content Version: 14.3    2024 [employer].   Care instructions adapted under license by your healthcare professional. If you have questions about a medical condition or this instruction, always ask your healthcare professional. [employer] disclaims any warranty or liability for your use of this information.       Lung Cancer Screening   Frequently Asked Questions  If you are at high-risk for lung cancer, getting screened with low-dose computed tomography (LDCT) every year can help save your life. This handout offers answers to some of the most common questions about lung cancer screening. If you have other questions, please call [phone] ([phone]).     What is it?  Lung cancer screening uses special X-ray technology to create an image of your lung tissue. The exam is quick and easy and takes less than 10 seconds. We don t give you any medicine or use any needles. You can eat before and after the exam. You don t need to change your clothes as long as the clothing on your chest doesn t contain metal. But, you do need to be able to hold your breath for at least 6 seconds during the exam.    What is the goal of lung " cancer screening?  The goal of lung cancer screening is to save lives. Many times, lung cancer is not found until a person starts having physical symptoms. Lung cancer screening can help detect lung cancer in the earliest stages when it may be easier to treat.    Who should be screened for lung cancer?  We suggest lung cancer screening for anyone who is at high-risk for lung cancer. You are in the high-risk group if you:      are between the ages of 55 and 79, and    have smoked at least 1 pack of cigarettes a day for 20 or more years, and    still smoke or have quit within the past 15 years.    However, if you have a new cough or shortness of breath, you should talk to your doctor before being screened.    Why does it matter if I have symptoms?  Certain symptoms can be a sign that you have a condition in your lungs that should be checked and treated by your doctor. These symptoms include fever, chest pain, a new or changing cough, shortness of breath that you have never felt before, coughing up blood or unexplained weight loss. Having any of these symptoms can greatly affect the results of lung cancer screening.       Should all smokers get an LDCT lung cancer screening exam?  It depends. Lung cancer screening is for a very specific group of men and women who have a history of heavy smoking over a long period of time (see  Who should be screened for lung cancer  above).  I am in the high-risk group, but have been diagnosed with cancer in the past. Is LDCT lung cancer screening right for me?  In some cases, you should not have LDCT lung screening, such as when your doctor is already following your cancer with CT scan studies. Your doctor will help you decide if LDCT lung screening is right for you.  Do I need to have a screening exam every year?  Yes. If you are in the high-risk group described earlier, you should get an LDCT lung cancer screening exam every year until you are 79, or are no longer willing or able to  undergo screening and possible procedures to diagnose and treat lung cancer.  How effective is LDCT at preventing death from lung cancer?  Studies have shown that LDCT lung cancer screening can lower the risk of death from lung cancer by 20 percent in people who are at high-risk.  What are the risks?  There are some risks and limitations of LDCT lung cancer screening. We want to make sure you understand the risks and benefits, so please let us know if you have any questions. Your doctor may want to talk with you more about these risks.    Radiation exposure: As with any exam that uses radiation, there is a very small increased risk of cancer. The amount of radiation in LDCT is small--about the same amount a person would get from a mammogram. Your doctor orders the exam when he or she feels the potential benefits outweigh the risks.    False negatives: No test is perfect, including LDCT. It is possible that you may have a medical condition, including lung cancer, that is not found during your exam. This is called a false negative result.    False positives and more testing: LDCT very often finds something in the lung that could be cancer, but in fact is not. This is called a false positive result. False positive tests often cause anxiety. To make sure these findings are not cancer, you may need to have more tests. These tests will be done only if you give us permission. Sometimes patients need a treatment that can have side effects, such as a biopsy. For more information on false positives, see  What can I expect from the results?     Findings not related to lung cancer: Your LDCT exam also takes pictures of areas of your body next to your lungs. In a very small number of cases, the CT scan will show an abnormal finding in one of these areas, such as your kidneys, adrenal glands, liver or thyroid. This finding may not be serious, but you may need more tests. Your doctor can help you decide what other tests you may  need, if any.  What can I expect from the results?  About 1 out of 4 LDCT exams will find something that may need more tests. Most of the time, these findings are lung nodules. Lung nodules are very small collections of tissue in the lung. These nodules are very common, and the vast majority--more than 97 percent--are not cancer (benign). Most are normal lymph nodes or small areas of scarring from past infections.  But, if a small lung nodule is found to be cancer, the cancer can be cured more than 90 percent of the time. To know if the nodule is cancer, we may need to get more images before your next yearly screening exam. If the nodule has suspicious features (for example, it is large, has an odd shape or grows over time), we will refer you to a specialist for further testing.  Will my doctor also get the results?  Yes. Your doctor will get a copy of your results.  Is it okay to keep smoking now that there s a cancer screening exam?  No. Tobacco is one of the strongest cancer-causing agents. It causes not only lung cancer, but other cancers and cardiovascular (heart) diseases as well. The damage caused by smoking builds over time. This means that the longer you smoke, the higher your risk of disease. While it is never too late to quit, the sooner you quit, the better.  Where can I find help to quit smoking?  The best way to prevent lung cancer is to stop smoking. If you have already quit smoking, congratulations and keep it up! For help on quitting smoking, please call [employer] at 9-108-QUIT-NOW (1-470.772.8131) or the American Cancer Society at 1-982.260.9328 to find local resources near you.  One-on-one health coaching:  If you d prefer to work individually with a health care provider on tobacco cessation, we offer:      Medication Therapy Management:  Our specially trained pharmacists work closely with you and your doctor to help you quit smoking.  Call [phone] or [phone] (toll free).

## 2025-02-12 DIAGNOSIS — R73.9 HYPERGLYCEMIA: Primary | ICD-10-CM

## 2025-02-12 LAB
EST. AVERAGE GLUCOSE BLD GHB EST-MCNC: 111 MG/DL
HBA1C MFR BLD: 5.5 % (ref 0–5.6)

## 2025-02-17 LAB
ABO + RH BLD: NORMAL
BLD GP AB SCN SERPL QL: NEGATIVE
SPECIMEN EXP DATE BLD: NORMAL

## 2025-02-18 ENCOUNTER — TELEPHONE (OUTPATIENT)
Dept: CARDIOLOGY | Facility: CLINIC | Age: 65
End: 2025-02-18

## 2025-02-18 ENCOUNTER — OFFICE VISIT (OUTPATIENT)
Dept: CARDIOLOGY | Facility: CLINIC | Age: 65
End: 2025-02-18
Attending: FAMILY MEDICINE
Payer: COMMERCIAL

## 2025-02-18 ENCOUNTER — PREP FOR PROCEDURE (OUTPATIENT)
Dept: CARDIOLOGY | Facility: CLINIC | Age: 65
End: 2025-02-18

## 2025-02-18 VITALS
RESPIRATION RATE: 14 BRPM | HEART RATE: 70 BPM | DIASTOLIC BLOOD PRESSURE: 74 MMHG | SYSTOLIC BLOOD PRESSURE: 120 MMHG | BODY MASS INDEX: 29.83 KG/M2 | WEIGHT: 202 LBS

## 2025-02-18 DIAGNOSIS — Z01.812 PRE-PROCEDURE LAB EXAM: Primary | ICD-10-CM

## 2025-02-18 DIAGNOSIS — I25.118 CORONARY ARTERY DISEASE OF NATIVE ARTERY OF NATIVE HEART WITH STABLE ANGINA PECTORIS: Primary | ICD-10-CM

## 2025-02-18 DIAGNOSIS — R07.89 ATYPICAL CHEST PAIN: ICD-10-CM

## 2025-02-18 DIAGNOSIS — I20.0 WORSENING ANGINA (H): ICD-10-CM

## 2025-02-18 DIAGNOSIS — R07.9 CHEST PAIN, UNSPECIFIED TYPE: ICD-10-CM

## 2025-02-18 DIAGNOSIS — Z01.812 PRE-PROCEDURE LAB EXAM: ICD-10-CM

## 2025-02-18 LAB
ANION GAP SERPL CALCULATED.3IONS-SCNC: 6 MMOL/L (ref 7–15)
ATRIAL RATE - MUSE: 56 BPM
ATRIAL RATE - MUSE: 62 BPM
BLOOD BANK CHART COMMENT: NORMAL
BUN SERPL-MCNC: 16.5 MG/DL (ref 8–23)
CALCIUM SERPL-MCNC: 9.2 MG/DL (ref 8.8–10.4)
CHLORIDE SERPL-SCNC: 107 MMOL/L (ref 98–107)
CREAT SERPL-MCNC: 0.92 MG/DL (ref 0.67–1.17)
DIASTOLIC BLOOD PRESSURE - MUSE: NORMAL MMHG
DIASTOLIC BLOOD PRESSURE - MUSE: NORMAL MMHG
EGFRCR SERPLBLD CKD-EPI 2021: >90 ML/MIN/1.73M2
ERYTHROCYTE [DISTWIDTH] IN BLOOD BY AUTOMATED COUNT: 13.1 % (ref 10–15)
GLUCOSE SERPL-MCNC: 68 MG/DL (ref 70–99)
HCO3 SERPL-SCNC: 27 MMOL/L (ref 22–29)
HCT VFR BLD AUTO: 40.7 % (ref 40–53)
HGB BLD-MCNC: 14.1 G/DL (ref 13.3–17.7)
INTERPRETATION ECG - MUSE: NORMAL
INTERPRETATION ECG - MUSE: NORMAL
MCH RBC QN AUTO: 31.6 PG (ref 26.5–33)
MCHC RBC AUTO-ENTMCNC: 34.6 G/DL (ref 31.5–36.5)
MCV RBC AUTO: 91 FL (ref 78–100)
P AXIS - MUSE: 33 DEGREES
P AXIS - MUSE: 44 DEGREES
PLATELET # BLD AUTO: 249 10E3/UL (ref 150–450)
POTASSIUM SERPL-SCNC: 4.7 MMOL/L (ref 3.4–5.3)
PR INTERVAL - MUSE: 148 MS
PR INTERVAL - MUSE: 148 MS
QRS DURATION - MUSE: 72 MS
QRS DURATION - MUSE: 84 MS
QT - MUSE: 390 MS
QT - MUSE: 452 MS
QTC - MUSE: 376 MS
QTC - MUSE: 458 MS
R AXIS - MUSE: 30 DEGREES
R AXIS - MUSE: 31 DEGREES
RBC # BLD AUTO: 4.46 10E6/UL (ref 4.4–5.9)
SODIUM SERPL-SCNC: 140 MMOL/L (ref 135–145)
SPECIMEN EXP DATE BLD: NORMAL
SYSTOLIC BLOOD PRESSURE - MUSE: NORMAL MMHG
SYSTOLIC BLOOD PRESSURE - MUSE: NORMAL MMHG
T AXIS - MUSE: 11 DEGREES
T AXIS - MUSE: 19 DEGREES
VENTRICULAR RATE- MUSE: 56 BPM
VENTRICULAR RATE- MUSE: 62 BPM
WBC # BLD AUTO: 4.1 10E3/UL (ref 4–11)

## 2025-02-18 PROCEDURE — 86900 BLOOD TYPING SEROLOGIC ABO: CPT | Performed by: STUDENT IN AN ORGANIZED HEALTH CARE EDUCATION/TRAINING PROGRAM

## 2025-02-18 PROCEDURE — 80048 BASIC METABOLIC PNL TOTAL CA: CPT | Performed by: STUDENT IN AN ORGANIZED HEALTH CARE EDUCATION/TRAINING PROGRAM

## 2025-02-18 PROCEDURE — 99204 OFFICE O/P NEW MOD 45 MIN: CPT | Performed by: STUDENT IN AN ORGANIZED HEALTH CARE EDUCATION/TRAINING PROGRAM

## 2025-02-18 PROCEDURE — 36415 COLL VENOUS BLD VENIPUNCTURE: CPT | Performed by: STUDENT IN AN ORGANIZED HEALTH CARE EDUCATION/TRAINING PROGRAM

## 2025-02-18 PROCEDURE — 86850 RBC ANTIBODY SCREEN: CPT | Performed by: STUDENT IN AN ORGANIZED HEALTH CARE EDUCATION/TRAINING PROGRAM

## 2025-02-18 PROCEDURE — G2211 COMPLEX E/M VISIT ADD ON: HCPCS | Performed by: STUDENT IN AN ORGANIZED HEALTH CARE EDUCATION/TRAINING PROGRAM

## 2025-02-18 PROCEDURE — 85027 COMPLETE CBC AUTOMATED: CPT | Performed by: STUDENT IN AN ORGANIZED HEALTH CARE EDUCATION/TRAINING PROGRAM

## 2025-02-18 PROCEDURE — 93000 ELECTROCARDIOGRAM COMPLETE: CPT | Performed by: STUDENT IN AN ORGANIZED HEALTH CARE EDUCATION/TRAINING PROGRAM

## 2025-02-18 PROCEDURE — 86901 BLOOD TYPING SEROLOGIC RH(D): CPT | Performed by: STUDENT IN AN ORGANIZED HEALTH CARE EDUCATION/TRAINING PROGRAM

## 2025-02-18 RX ORDER — ASPIRIN 81 MG/1
81 TABLET ORAL DAILY
Qty: 90 TABLET | Refills: 3 | Status: SHIPPED | OUTPATIENT
Start: 2025-02-18

## 2025-02-18 RX ORDER — LIDOCAINE 40 MG/G
CREAM TOPICAL
Status: CANCELLED | OUTPATIENT
Start: 2025-02-18

## 2025-02-18 RX ORDER — SODIUM CHLORIDE 9 MG/ML
INJECTION, SOLUTION INTRAVENOUS CONTINUOUS
Status: CANCELLED | OUTPATIENT
Start: 2025-02-20

## 2025-02-18 RX ORDER — FENTANYL CITRATE 50 UG/ML
25 INJECTION, SOLUTION INTRAMUSCULAR; INTRAVENOUS
Status: CANCELLED | OUTPATIENT
Start: 2025-02-20

## 2025-02-18 RX ORDER — ASPIRIN 81 MG/1
243 TABLET, CHEWABLE ORAL ONCE
Status: CANCELLED | OUTPATIENT
Start: 2025-02-20

## 2025-02-18 RX ORDER — ASPIRIN 325 MG
325 TABLET ORAL ONCE
Status: CANCELLED | OUTPATIENT
Start: 2025-02-20 | End: 2025-02-18

## 2025-02-18 RX ORDER — METOPROLOL SUCCINATE 25 MG/1
25 TABLET, EXTENDED RELEASE ORAL DAILY
Qty: 90 TABLET | Refills: 3 | Status: SHIPPED | OUTPATIENT
Start: 2025-02-18

## 2025-02-18 NOTE — PATIENT INSTRUCTIONS
Ultrasound of the heart and angiogram, will get you scheduled.    Start aspirin 81 mg daily.    Start metoprolol 25 mg daily for chest pain.

## 2025-02-18 NOTE — TELEPHONE ENCOUNTER
In-office teach for CA poss PCI/LHC. Discussed procedure and provided procedure handout. Reviewed pre-procedure questions and medications. Pt scheduled and provided procedure prep. Pt has my direct number should he have any needs prior to his procedure.    Randall J Jennissen  [address]  [facility] [address]  [phone] (home)     Procedure cardiologist:  ISABELL/HINA  PCP:  [facility] [place]  H&P completed by:  DAVID 2/18/25  Admit date 2/20/25  Arrival time:  0930  Anticoagulation: None  CPAP: No  Previous PCI: No  Bypass Grafts: No  Renal Issues: No  Diabetic?: No  Device?: No  Type:  N/A  Ambulation status: Independent    Patient Education/  Angiogram Teaching    Reason for Visit:  Patient seen for pre-procedure education in preparation for: CA poss PCI/LHC      Procedure Prep:  Primary Cardiologist note dated: DAVID 2/18/25  EKG results obtained, dated: on admission  Hemogram results obtained: 2/18/25  Basic Metabolic Panel results obtained: 2/18/25  Lipid Profile results obtained: 8/20/24  Pertinent cardiac test results: None. Worsening Angina and CAD        Pre-procedure instructions  Patient instructed to be NPO per anesthesia guidelines.  Patient instructed to shower the evening before or the morning of the procedure.  Patient instructed to arrange for transportation home following procedure from a responsible family member of friend. No driving for at least 24 hours.  Patient instructed to have a responsible adult with them for 24 hours post-procedure.  Post-procedure follow up process.  Conscious sedation discussed.    Pre-procedure medication instructions  Patient instructed on antiplatelet medication.  Continue medications as scheduled, with a small amount of water on the day of the procedure unless indicated.  Patient instructed to take 324 mg of Aspirin am of procedure: Yes  Other medication: instructed to pt advised to hold OTC medications/supplements/vitamins and NSAIDS  a.m. of the procedure. Advised to take other meds as prescribed (Metoprolol).       Patient states understanding of procedure and agrees to proceed.    *PATIENTS RECORDS AVAILABLE IN [facility] UNLESS OTHERWISE INDICATED*      Patient Active Problem List   Diagnosis    Hyperlipidemia LDL goal <160    Allergic conjunctivitis    Non morbid obesity due to excess calories    JO (obstructive sleep apnea)    Neuropathic pain    Chronic pain of both shoulders       Current Outpatient Medications   Medication Sig Dispense Refill    aspirin 81 MG EC tablet Take 1 tablet (81 mg) by mouth daily. 90 tablet 3    CELEBREX 100 MG capsule Take 100 mg by mouth 2 times daily. Takes 100 mg in  mg pm      IBUPROFEN 200 MG OR TABS 600 mg by mouth at bedtime (Patient not taking: Reported on 8/20/2024) 120 0    metoprolol succinate ER (TOPROL XL) 25 MG 24 hr tablet Take 1 tablet (25 mg) by mouth daily. 90 tablet 3    NEW MED Umary one tab at HS (Patient not taking: Reported on 8/20/2024)      omeprazole (PRILOSEC) 40 MG DR capsule Take 1 capsule (40 mg) by mouth daily (Patient not taking: Reported on 2/11/2025) 30 capsule 0    rosuvastatin (CRESTOR) 10 MG tablet Take 1 tablet (10 mg) by mouth daily (Patient not taking: Reported on 9/11/2024) 90 tablet 0    sucralfate (CARAFATE) 1 GM tablet Take 1 tablet (1 g) by mouth 4 times daily (Patient not taking: Reported on 2/11/2025) 30 tablet 0       No Known Allergies      Jazmine Lagos, RN, BSN

## 2025-02-18 NOTE — TELEPHONE ENCOUNTER
----- Message from Kelly Elise sent at 2/18/2025  9:27 AM CST -----  Regarding: KALEY - IN CLINIC TEACH  Case type: CA POSS PCI/LHC    Procedure Physician(s): HINA/ISABELL    Procedure Date and Patient Arrival Time: Thursday 2/20, with arrival time of 0930AM    H&P: Completed on 2/18 WITH DR. ROCHE    Pre-Procedure Lab Appt: Done Today - Please place lab orders if you haven't already!    Alerts/Important Info: None    Interpretor Requested: NA    #SAME DAY ECHO REQUESTED      Thank you,  Kelly

## 2025-02-18 NOTE — LETTER
"2/18/2025    [facility]  [address] [place] [id number]    RE: Carmelo Tamayonissen       Dear Colleague,     I had the pleasure of seeing Randall J Jennissen in the [facility].  Cardiology Clinic    Randall J Jennissen is a 64 year old male with a history of hyperlipidemia and tobacco abuse who presents for evaluation of chest pain.    Carmelo has been having chest pain for about 2 to 3 years.  The episodes are similar most times, he is at rest and has the acute onset of severe chest pain with radiation to the shoulder.  He does not have nausea or shortness of breath.  These episodes last for 2 to 5 minutes and have been becoming more common.  He states on 1 occasion he thought he was a \"Goner\".  He has noticed a subtle decline in his exercise capacity and some more shortness of breath with exertion.  He no longer exercises.    General: Well appearing, appears stated age.  CV: Normal rate and rhythm. No m/r/g. No JVD.   Pulm: Normal effort. Normal breath sounds.  Lower extremities: No lower extremity edema.    Labs:   Reviewed in epic.  Creatinine 1.0  Hemoglobin A1c 5.5%  LDL 94  Hemoglobin 13.5    Testing:  No exercise stress 6/2024: 10.1 METS, no significant EKG changes or chest pain  CT lung cancer screening 10/2023: Mild coronary calcification  No TTE    Assessment and Plan:    1.  Angina.  His angina has atypical and typical features, however given its severity and his risk factors, I am concerned for coronary disease.  We discussed multiple strategies including a stress test with imaging versus coronary angiography, and the patient would like to proceed with coronary angiography which I think is reasonable given his significant risk factors and severe symptoms.  Will start metoprolol 25 mg daily and aspirin 81 mg daily in the meantime.  TTE same day.    The longitudinal plan of care for the diagnosis(es)/condition(s) as documented were addressed during " this visit. Due to the added complexity in care, I will continue to support Nader in the subsequent management and with ongoing continuity of care.    Return in about 6 months (around 8/18/2025).    Ron Kaye MD  Interventional Cardiology      Thank you for allowing me to participate in the care of your patient.      Sincerely,     Ron Kaye MD     [facility]  cc:   Lizzie Austin MD  [address] [place] [id number]

## 2025-02-18 NOTE — PROGRESS NOTES
"Cardiology Clinic    Randall J Jennissen is a 64 year old male with a history of hyperlipidemia and tobacco abuse who presents for evaluation of chest pain.    Carmelo has been having chest pain for about 2 to 3 years.  The episodes are similar most times, he is at rest and has the acute onset of severe chest pain with radiation to the shoulder.  He does not have nausea or shortness of breath.  These episodes last for 2 to 5 minutes and have been becoming more common.  He states on 1 occasion he thought he was a \"Goner\".  He has noticed a subtle decline in his exercise capacity and some more shortness of breath with exertion.  He no longer exercises.    General: Well appearing, appears stated age.  CV: Normal rate and rhythm. No m/r/g. No JVD.   Pulm: Normal effort. Normal breath sounds.  Lower extremities: No lower extremity edema.    Labs:   Reviewed in epic.  Creatinine 1.0  Hemoglobin A1c 5.5%  LDL 94  Hemoglobin 13.5    Testing:  No exercise stress 6/2024: 10.1 METS, no significant EKG changes or chest pain  CT lung cancer screening 10/2023: Mild coronary calcification  No TTE    Assessment and Plan:    1.  Angina.  His angina has atypical and typical features, however given its severity and his risk factors, I am concerned for coronary disease.  We discussed multiple strategies including a stress test with imaging versus coronary angiography, and the patient would like to proceed with coronary angiography which I think is reasonable given his significant risk factors and severe symptoms.  Will start metoprolol 25 mg daily and aspirin 81 mg daily in the meantime.  TTE same day.    The longitudinal plan of care for the diagnosis(es)/condition(s) as documented were addressed during this visit. Due to the added complexity in care, I will continue to support Nader in the subsequent management and with ongoing continuity of care.    Return in about 6 months (around 8/18/2025).    Ron Kaye MD  Interventional " Cardiology

## 2025-02-20 ENCOUNTER — HOSPITAL ENCOUNTER (OUTPATIENT)
Facility: HOSPITAL | Age: 65
Discharge: HOME | End: 2025-02-20
Attending: INTERNAL MEDICINE | Admitting: INTERNAL MEDICINE
Payer: COMMERCIAL

## 2025-02-20 ENCOUNTER — MEDICAL CORRESPONDENCE (OUTPATIENT)
Dept: HEALTH INFORMATION MANAGEMENT | Facility: CLINIC | Age: 65
End: 2025-02-20

## 2025-02-20 VITALS
HEIGHT: 69 IN | DIASTOLIC BLOOD PRESSURE: 62 MMHG | BODY MASS INDEX: 29.92 KG/M2 | WEIGHT: 202 LBS | RESPIRATION RATE: 21 BRPM | HEART RATE: 59 BPM | OXYGEN SATURATION: 98 % | SYSTOLIC BLOOD PRESSURE: 113 MMHG | TEMPERATURE: 97.1 F

## 2025-02-20 DIAGNOSIS — R07.89 ATYPICAL CHEST PAIN: ICD-10-CM

## 2025-02-20 DIAGNOSIS — I20.0 WORSENING ANGINA (H): ICD-10-CM

## 2025-02-20 DIAGNOSIS — Z95.5 S/P RIGHT CORONARY ARTERY (RCA) STENT PLACEMENT: Primary | ICD-10-CM

## 2025-02-20 DIAGNOSIS — I25.118 CORONARY ARTERY DISEASE OF NATIVE ARTERY OF NATIVE HEART WITH STABLE ANGINA PECTORIS: ICD-10-CM

## 2025-02-20 DIAGNOSIS — R07.9 CHEST PAIN, UNSPECIFIED TYPE: ICD-10-CM

## 2025-02-20 PROBLEM — Z98.890 STATUS POST CORONARY ANGIOGRAM: Status: ACTIVE | Noted: 2025-02-20

## 2025-02-20 LAB
ACT BLD: 239 SECONDS (ref 74–150)
ATRIAL RATE - MUSE: 51 BPM
ATRIAL RATE - MUSE: 55 BPM
CHOLEST SERPL-MCNC: 218 MG/DL
DIASTOLIC BLOOD PRESSURE - MUSE: NORMAL MMHG
DIASTOLIC BLOOD PRESSURE - MUSE: NORMAL MMHG
FASTING STATUS PATIENT QL REPORTED: YES
HDLC SERPL-MCNC: 64 MG/DL
INTERPRETATION ECG - MUSE: NORMAL
INTERPRETATION ECG - MUSE: NORMAL
LDLC SERPL CALC-MCNC: 137 MG/DL
NONHDLC SERPL-MCNC: 154 MG/DL
P AXIS - MUSE: 53 DEGREES
P AXIS - MUSE: 58 DEGREES
PR INTERVAL - MUSE: 150 MS
PR INTERVAL - MUSE: 160 MS
QRS DURATION - MUSE: 78 MS
QRS DURATION - MUSE: 80 MS
QT - MUSE: 410 MS
QT - MUSE: 432 MS
QTC - MUSE: 392 MS
QTC - MUSE: 398 MS
R AXIS - MUSE: 36 DEGREES
R AXIS - MUSE: 49 DEGREES
SYSTOLIC BLOOD PRESSURE - MUSE: NORMAL MMHG
SYSTOLIC BLOOD PRESSURE - MUSE: NORMAL MMHG
T AXIS - MUSE: 14 DEGREES
T AXIS - MUSE: 9 DEGREES
TRIGL SERPL-MCNC: 87 MG/DL
VENTRICULAR RATE- MUSE: 51 BPM
VENTRICULAR RATE- MUSE: 55 BPM

## 2025-02-20 PROCEDURE — 250N000013 HC RX MED GY IP 250 OP 250 PS 637: Performed by: NURSE PRACTITIONER

## 2025-02-20 PROCEDURE — 99152 MOD SED SAME PHYS/QHP 5/>YRS: CPT | Performed by: INTERNAL MEDICINE

## 2025-02-20 PROCEDURE — 93005 ELECTROCARDIOGRAM TRACING: CPT

## 2025-02-20 PROCEDURE — 272N000001 HC OR GENERAL SUPPLY STERILE: Performed by: INTERNAL MEDICINE

## 2025-02-20 PROCEDURE — 255N000002 HC RX 255 OP 636: Performed by: INTERNAL MEDICINE

## 2025-02-20 PROCEDURE — C1874 STENT, COATED/COV W/DEL SYS: HCPCS | Performed by: INTERNAL MEDICINE

## 2025-02-20 PROCEDURE — 250N000011 HC RX IP 250 OP 636: Performed by: INTERNAL MEDICINE

## 2025-02-20 PROCEDURE — 92928 PRQ TCAT PLMT NTRAC ST 1 LES: CPT | Mod: RC | Performed by: INTERNAL MEDICINE

## 2025-02-20 PROCEDURE — 999N000054 HC STATISTIC EKG NON-CHARGEABLE

## 2025-02-20 PROCEDURE — 250N000009 HC RX 250: Performed by: INTERNAL MEDICINE

## 2025-02-20 PROCEDURE — 93454 CORONARY ARTERY ANGIO S&I: CPT | Mod: XU | Performed by: INTERNAL MEDICINE

## 2025-02-20 PROCEDURE — 93010 ELECTROCARDIOGRAM REPORT: CPT | Mod: 77 | Performed by: INTERNAL MEDICINE

## 2025-02-20 PROCEDURE — 250N000013 HC RX MED GY IP 250 OP 250 PS 637: Performed by: INTERNAL MEDICINE

## 2025-02-20 PROCEDURE — C1769 GUIDE WIRE: HCPCS | Performed by: INTERNAL MEDICINE

## 2025-02-20 PROCEDURE — 93010 ELECTROCARDIOGRAM REPORT: CPT | Performed by: INTERNAL MEDICINE

## 2025-02-20 PROCEDURE — 93454 CORONARY ARTERY ANGIO S&I: CPT | Mod: 26 | Performed by: INTERNAL MEDICINE

## 2025-02-20 PROCEDURE — C1725 CATH, TRANSLUMIN NON-LASER: HCPCS | Performed by: INTERNAL MEDICINE

## 2025-02-20 PROCEDURE — 85347 COAGULATION TIME ACTIVATED: CPT

## 2025-02-20 PROCEDURE — 80061 LIPID PANEL: CPT | Performed by: STUDENT IN AN ORGANIZED HEALTH CARE EDUCATION/TRAINING PROGRAM

## 2025-02-20 PROCEDURE — 36415 COLL VENOUS BLD VENIPUNCTURE: CPT | Performed by: STUDENT IN AN ORGANIZED HEALTH CARE EDUCATION/TRAINING PROGRAM

## 2025-02-20 PROCEDURE — 258N000003 HC RX IP 258 OP 636: Performed by: STUDENT IN AN ORGANIZED HEALTH CARE EDUCATION/TRAINING PROGRAM

## 2025-02-20 PROCEDURE — C1894 INTRO/SHEATH, NON-LASER: HCPCS | Performed by: INTERNAL MEDICINE

## 2025-02-20 PROCEDURE — C9600 PERC DRUG-EL COR STENT SING: HCPCS | Performed by: INTERNAL MEDICINE

## 2025-02-20 PROCEDURE — C1887 CATHETER, GUIDING: HCPCS | Performed by: INTERNAL MEDICINE

## 2025-02-20 DEVICE — STENT CORONARY DES SYNERGY XD MR US 3.50X28MM H7493941828350: Type: IMPLANTABLE DEVICE | Status: FUNCTIONAL

## 2025-02-20 RX ORDER — NITROGLYCERIN 0.4 MG/1
0.4 TABLET SUBLINGUAL EVERY 5 MIN PRN
Status: DISCONTINUED | OUTPATIENT
Start: 2025-02-20 | End: 2025-02-20 | Stop reason: HOSPADM

## 2025-02-20 RX ORDER — OXYCODONE HYDROCHLORIDE 5 MG/1
5 TABLET ORAL EVERY 4 HOURS PRN
Status: DISCONTINUED | OUTPATIENT
Start: 2025-02-20 | End: 2025-02-20 | Stop reason: HOSPADM

## 2025-02-20 RX ORDER — ONDANSETRON 4 MG/1
4 TABLET, ORALLY DISINTEGRATING ORAL EVERY 6 HOURS PRN
Status: DISCONTINUED | OUTPATIENT
Start: 2025-02-20 | End: 2025-02-20 | Stop reason: HOSPADM

## 2025-02-20 RX ORDER — ASPIRIN 81 MG/1
81 TABLET ORAL DAILY
Status: DISCONTINUED | OUTPATIENT
Start: 2025-02-21 | End: 2025-02-20 | Stop reason: HOSPADM

## 2025-02-20 RX ORDER — SODIUM CHLORIDE 9 MG/ML
INJECTION, SOLUTION INTRAVENOUS CONTINUOUS
Status: DISCONTINUED | OUTPATIENT
Start: 2025-02-20 | End: 2025-02-20 | Stop reason: HOSPADM

## 2025-02-20 RX ORDER — NALOXONE HYDROCHLORIDE 0.4 MG/ML
0.2 INJECTION, SOLUTION INTRAMUSCULAR; INTRAVENOUS; SUBCUTANEOUS
Status: DISCONTINUED | OUTPATIENT
Start: 2025-02-20 | End: 2025-02-20 | Stop reason: HOSPADM

## 2025-02-20 RX ORDER — ATORVASTATIN CALCIUM 40 MG/1
40 TABLET, FILM COATED ORAL DAILY
Qty: 90 TABLET | Refills: 3 | Status: SHIPPED | OUTPATIENT
Start: 2025-02-20

## 2025-02-20 RX ORDER — OXYCODONE HYDROCHLORIDE 5 MG/1
10 TABLET ORAL EVERY 4 HOURS PRN
Status: DISCONTINUED | OUTPATIENT
Start: 2025-02-20 | End: 2025-02-20 | Stop reason: HOSPADM

## 2025-02-20 RX ORDER — CLOPIDOGREL BISULFATE 75 MG/1
TABLET ORAL
Status: DISCONTINUED | OUTPATIENT
Start: 2025-02-20 | End: 2025-02-20 | Stop reason: HOSPADM

## 2025-02-20 RX ORDER — LIDOCAINE 40 MG/G
CREAM TOPICAL
Status: DISCONTINUED | OUTPATIENT
Start: 2025-02-20 | End: 2025-02-20 | Stop reason: HOSPADM

## 2025-02-20 RX ORDER — ASPIRIN 325 MG
325 TABLET ORAL ONCE
Status: COMPLETED | OUTPATIENT
Start: 2025-02-20 | End: 2025-02-20

## 2025-02-20 RX ORDER — NALOXONE HYDROCHLORIDE 0.4 MG/ML
0.4 INJECTION, SOLUTION INTRAMUSCULAR; INTRAVENOUS; SUBCUTANEOUS
Status: DISCONTINUED | OUTPATIENT
Start: 2025-02-20 | End: 2025-02-20 | Stop reason: HOSPADM

## 2025-02-20 RX ORDER — FLUMAZENIL 0.1 MG/ML
0.2 INJECTION, SOLUTION INTRAVENOUS
Status: DISCONTINUED | OUTPATIENT
Start: 2025-02-20 | End: 2025-02-20 | Stop reason: HOSPADM

## 2025-02-20 RX ORDER — FENTANYL CITRATE 50 UG/ML
INJECTION, SOLUTION INTRAMUSCULAR; INTRAVENOUS
Status: DISCONTINUED | OUTPATIENT
Start: 2025-02-20 | End: 2025-02-20 | Stop reason: HOSPADM

## 2025-02-20 RX ORDER — DIAZEPAM 5 MG/1
5 TABLET ORAL ONCE
Status: COMPLETED | OUTPATIENT
Start: 2025-02-20 | End: 2025-02-20

## 2025-02-20 RX ORDER — CLOPIDOGREL BISULFATE 75 MG/1
75 TABLET ORAL DAILY
Status: DISCONTINUED | OUTPATIENT
Start: 2025-02-21 | End: 2025-02-20 | Stop reason: HOSPADM

## 2025-02-20 RX ORDER — IODIXANOL 320 MG/ML
INJECTION, SOLUTION INTRAVASCULAR
Status: DISCONTINUED | OUTPATIENT
Start: 2025-02-20 | End: 2025-02-20 | Stop reason: HOSPADM

## 2025-02-20 RX ORDER — ATORVASTATIN CALCIUM 40 MG/1
40 TABLET, FILM COATED ORAL DAILY
Status: DISCONTINUED | OUTPATIENT
Start: 2025-02-20 | End: 2025-02-20 | Stop reason: HOSPADM

## 2025-02-20 RX ORDER — ATROPINE SULFATE 0.1 MG/ML
0.5 INJECTION INTRAVENOUS
Status: DISCONTINUED | OUTPATIENT
Start: 2025-02-20 | End: 2025-02-20 | Stop reason: HOSPADM

## 2025-02-20 RX ORDER — ONDANSETRON 2 MG/ML
4 INJECTION INTRAMUSCULAR; INTRAVENOUS EVERY 6 HOURS PRN
Status: DISCONTINUED | OUTPATIENT
Start: 2025-02-20 | End: 2025-02-20 | Stop reason: HOSPADM

## 2025-02-20 RX ORDER — ACETAMINOPHEN 325 MG/1
650 TABLET ORAL EVERY 4 HOURS PRN
Status: DISCONTINUED | OUTPATIENT
Start: 2025-02-20 | End: 2025-02-20 | Stop reason: HOSPADM

## 2025-02-20 RX ORDER — HYDRALAZINE HYDROCHLORIDE 20 MG/ML
10 INJECTION INTRAMUSCULAR; INTRAVENOUS EVERY 4 HOURS PRN
Status: DISCONTINUED | OUTPATIENT
Start: 2025-02-20 | End: 2025-02-20 | Stop reason: HOSPADM

## 2025-02-20 RX ORDER — METOPROLOL TARTRATE 1 MG/ML
5 INJECTION, SOLUTION INTRAVENOUS
Status: DISCONTINUED | OUTPATIENT
Start: 2025-02-20 | End: 2025-02-20 | Stop reason: HOSPADM

## 2025-02-20 RX ORDER — FENTANYL CITRATE 50 UG/ML
25 INJECTION, SOLUTION INTRAMUSCULAR; INTRAVENOUS
Status: DISCONTINUED | OUTPATIENT
Start: 2025-02-20 | End: 2025-02-20 | Stop reason: HOSPADM

## 2025-02-20 RX ORDER — NITROGLYCERIN 5 MG/ML
VIAL (ML) INTRAVENOUS
Status: DISCONTINUED | OUTPATIENT
Start: 2025-02-20 | End: 2025-02-20 | Stop reason: HOSPADM

## 2025-02-20 RX ORDER — CLOPIDOGREL BISULFATE 75 MG/1
75 TABLET ORAL DAILY
Qty: 90 TABLET | Refills: 3 | Status: SHIPPED | OUTPATIENT
Start: 2025-02-21

## 2025-02-20 RX ORDER — HEPARIN SODIUM 1000 [USP'U]/ML
INJECTION, SOLUTION INTRAVENOUS; SUBCUTANEOUS
Status: DISCONTINUED | OUTPATIENT
Start: 2025-02-20 | End: 2025-02-20 | Stop reason: HOSPADM

## 2025-02-20 RX ORDER — HEPARIN SODIUM 200 [USP'U]/100ML
INJECTION, SOLUTION INTRAVENOUS
Status: DISCONTINUED | OUTPATIENT
Start: 2025-02-20 | End: 2025-02-20 | Stop reason: HOSPADM

## 2025-02-20 RX ORDER — ASPIRIN 81 MG/1
243 TABLET, CHEWABLE ORAL ONCE
Status: COMPLETED | OUTPATIENT
Start: 2025-02-20 | End: 2025-02-20

## 2025-02-20 RX ADMIN — SODIUM CHLORIDE: 0.9 INJECTION, SOLUTION INTRAVENOUS at 09:51

## 2025-02-20 RX ADMIN — ACETAMINOPHEN 650 MG: 325 TABLET ORAL at 16:21

## 2025-02-20 RX ADMIN — ATORVASTATIN CALCIUM 40 MG: 40 TABLET, FILM COATED ORAL at 16:11

## 2025-02-20 RX ADMIN — DIAZEPAM 5 MG: 5 TABLET ORAL at 11:45

## 2025-02-20 ASSESSMENT — ACTIVITIES OF DAILY LIVING (ADL)
ADLS_ACUITY_SCORE: 41

## 2025-02-20 NOTE — PRE-PROCEDURE
GENERAL PRE-PROCEDURE:   Procedure:  Coronary angiogram with possible PCI, left heart catheterization  Date/Time:  2/20/2025 9:54 AM    Written consent obtained?: Yes    Risks and benefits: Risks, benefits and alternatives were discussed    Consent given by:  Patient  Patient states understanding of procedure being performed: Yes    Patient's understanding of procedure matches consent: Yes    Procedure consent matches procedure scheduled: Yes    Expected level of sedation:  Moderate  Appropriately NPO:  Yes  ASA Class:  3 (chest pain, HLD, tobacco use disorder, Borderline Obesity; BMI 29.83kg/m2)  Mallampati  :  Grade 2- soft palate, base of uvula, tonsillar pillars, and portion of posterior pharyngeal wall visible  Lungs:  Lungs clear with good breath sounds bilaterally  Heart:  Other (comment)  Heart exam comment:  Cosme, regular  History & Physical reviewed:  History and physical reviewed and updates made (see comment)  H&P Comments:  Clinically Significant Risk Factors Present on Admission    Cardiovascular : chest pain, HLD, tobacco use disorder, Borderline Obesity; BMI 29.83kg/m2    Fluid & Electrolyte Disorders : Not present on admission    Gastroenterology : Not present on admission    Hematology/Oncology : Not present on admission    Nephrology : Not present on admission    Neurology : Not present on admission    Pulmonology : tobacco use disorder, sleep disordered breathing    Systemic : Borderline Obesity; BMI 29.83kg/m2      Statement of review:  I have reviewed the lab findings, diagnostic data, medications, and the plan for sedation

## 2025-02-20 NOTE — DISCHARGE INSTRUCTIONS

## 2025-02-20 NOTE — Clinical Note
The first balloon was inserted into the right coronary artery.Max pressure = 12 rekha. Total duration = 8 seconds.     Max pressure = 18 rekha. Total duration = 8 seconds.    Balloon reinflated a second time: Max pressure = 18 rekha. Total duration = 8 seconds.

## 2025-02-20 NOTE — INTERVAL H&P NOTE
"I have reviewed the surgical (or preoperative) H&P that is linked to this encounter, and examined the patient. There are no significant changes    Clinical Conditions Present on Arrival:  Clinically Significant Risk Factors Present on Admission                  # Drug Induced Platelet Defect: home medication list includes an antiplatelet medication      # Overweight: Estimated body mass index is 29.83 kg/m  as calculated from the following:    Height as of this encounter: 1.753 m (5' 9\").    Weight as of this encounter: 91.6 kg (202 lb).       "

## 2025-02-20 NOTE — Clinical Note
Stent deployed in the distal right coronary artery. Max pressure = 14 rekha. Total duration = 14 seconds.

## 2025-02-21 NOTE — PROGRESS NOTES
Patient is kept comfortable during post-procedure stay. VSS. Denies pain. Right radial access site remains dry & free from signs of bleeding. Tolerated food and fluids. Ambulated without issues. Appointments made & included in AVS. Dr. Tolilver was able to speak with patient and wife post procedure. Post-op instructions reviewed and packet given to patient & spouse. Able to ask questions. Verbalized no concerns. Belongings returned. Discharged in stable condition.

## 2025-02-24 ENCOUNTER — HOSPITAL ENCOUNTER (OUTPATIENT)
Dept: CT IMAGING | Facility: HOSPITAL | Age: 65
Discharge: HOME | End: 2025-02-24
Attending: FAMILY MEDICINE
Payer: COMMERCIAL

## 2025-02-24 ENCOUNTER — HOSPITAL ENCOUNTER (OUTPATIENT)
Dept: ULTRASOUND IMAGING | Facility: HOSPITAL | Age: 65
Discharge: HOME | End: 2025-02-24
Attending: FAMILY MEDICINE
Payer: COMMERCIAL

## 2025-02-24 DIAGNOSIS — Z87.891 PERSONAL HISTORY OF TOBACCO USE: ICD-10-CM

## 2025-02-24 DIAGNOSIS — Z13.6 SCREENING FOR AAA (ABDOMINAL AORTIC ANEURYSM): ICD-10-CM

## 2025-02-24 PROCEDURE — 76775 US EXAM ABDO BACK WALL LIM: CPT

## 2025-02-24 PROCEDURE — 71271 CT THORAX LUNG CANCER SCR C-: CPT

## 2025-02-26 ENCOUNTER — HOSPITAL ENCOUNTER (OUTPATIENT)
Dept: CARDIAC REHAB | Facility: CLINIC | Age: 65
Discharge: HOME | End: 2025-02-26
Attending: INTERNAL MEDICINE
Payer: COMMERCIAL

## 2025-02-26 PROCEDURE — 93798 PHYS/QHP OP CAR RHAB W/ECG: CPT

## 2025-02-26 PROCEDURE — 93797 PHYS/QHP OP CAR RHAB WO ECG: CPT

## 2025-02-28 ENCOUNTER — HOSPITAL ENCOUNTER (OUTPATIENT)
Dept: CARDIAC REHAB | Facility: CLINIC | Age: 65
Discharge: HOME | End: 2025-02-28
Attending: INTERNAL MEDICINE
Payer: COMMERCIAL

## 2025-02-28 DIAGNOSIS — Z95.5 S/P CORONARY ARTERY STENT PLACEMENT: ICD-10-CM

## 2025-02-28 PROCEDURE — 93798 PHYS/QHP OP CAR RHAB W/ECG: CPT

## 2025-03-03 ENCOUNTER — HOSPITAL ENCOUNTER (OUTPATIENT)
Dept: CARDIAC REHAB | Facility: CLINIC | Age: 65
Discharge: HOME | End: 2025-03-03
Attending: INTERNAL MEDICINE
Payer: COMMERCIAL

## 2025-03-03 ENCOUNTER — OFFICE VISIT (OUTPATIENT)
Dept: CARDIOLOGY | Facility: CLINIC | Age: 65
End: 2025-03-03
Payer: COMMERCIAL

## 2025-03-03 VITALS
DIASTOLIC BLOOD PRESSURE: 62 MMHG | BODY MASS INDEX: 29.53 KG/M2 | SYSTOLIC BLOOD PRESSURE: 108 MMHG | HEART RATE: 64 BPM | WEIGHT: 199.4 LBS | HEIGHT: 69 IN

## 2025-03-03 DIAGNOSIS — I25.10 CORONARY ARTERY DISEASE INVOLVING NATIVE CORONARY ARTERY OF NATIVE HEART WITHOUT ANGINA PECTORIS: ICD-10-CM

## 2025-03-03 DIAGNOSIS — E78.5 HYPERLIPIDEMIA LDL GOAL <70: ICD-10-CM

## 2025-03-03 DIAGNOSIS — Z95.5 S/P DRUG ELUTING CORONARY STENT PLACEMENT: Primary | ICD-10-CM

## 2025-03-03 PROCEDURE — 3074F SYST BP LT 130 MM HG: CPT

## 2025-03-03 PROCEDURE — 3078F DIAST BP <80 MM HG: CPT

## 2025-03-03 PROCEDURE — 93798 PHYS/QHP OP CAR RHAB W/ECG: CPT

## 2025-03-03 PROCEDURE — 99204 OFFICE O/P NEW MOD 45 MIN: CPT

## 2025-03-03 NOTE — LETTER
3/3/2025    [facility]  [address] [place] [id number]    RE: Randall J Jennissen       Dear Colleague,     I had the pleasure of seeing Randall J Jennissen in the [facility].          Assessment/Recommendations   Assessment:    1.  Coronary artery disease: DESx1 to distal RCA 2/20/2025. Mild to moderate nonobstructive disease elsewhere. LVEF 55-60%.  - On dual antiplatelet therapy with ASA 81 mg indefinitely and Clopidogrel (Plavix) 75 mg daily for 12 months.  - Patient denies any recurrent chest pain. Prior chest pain was atypical, question single 70% stenosis as true etiology.  - Cardiac rehab has been scheduled  - Reviewed most recent BMP, Hgb, platelet- stable.  - Metoprolol succinate 25 mg once daily    2.  Dyslipidemia: started on high intensity statin therapy with atorvastatin 40 mg. Most recent LDL is 137.     3.  Osteoarthritis: using Celebrex with DAPT      Plan:  - We discussed the importance of antiplatelet therapy and talking with his cardiologist prior to stopping these medications for any reason.    - Encouraged to seek medical attention if recurrent chest pain or shortness of breath.    - Continue metoprolol succinate until 4/11/2025, consider discontinuation    - Continue hyperlipidemia regimen. Fasting lipid profile check scheduled, Order placed. He wants to do this at primary clinic.    - Cardiac rehab as scheduled     - Should reduce or stop Celebrex with DAPT. He will try Tylenol.    - We discussed a diet low in saturated fat, weight loss, and exercise along with medication for better control of cholesterol.  Highly encouraged to participate in nutrition class in cardiac rehab.  - Risk factor modification and lifestyle management topics were discussed including managing comorbidities, weight loss, heart healthy diet, exercise, and smoking cessation.        Follow up with Dr. Kaye 4/11/2025     History of Present Illness/Subjective   Assessment    1  Ocular migraine (346 80) (G43 109)    Plan  Ocular migraine    · 1 - Rohit Turcios MD, Shy HALEY (Neurology) Physician Referral  Consult  Status: Hold For - Scheduling   Requested for: [id number]  Care Summary provided  : Yes   · Shukri Ralph MD, Jennifer Cantu  (Ophthalmology) Physician Referral  Consult  Status: Hold For -  Scheduling  Requested for: [id number]  Care Summary provided  : Yes    Discussion/Summary    She will follow up with neurology and optho for ocular migraine  Follow up if not improving  Chief Complaint  Patient here for follow up after going to [facility] ED for ocular migraines  acgCMA      History of Present Illness  She [place] [address] 0[address]04 and started having a hard time seeing road signs, got off the exit and called 911  Went to [facility] ER, was diagnosed with ocular migraine  CT scan head was negative  Was diagnosed with ocular migraine and told to see neurologist  Was given fioricet and zofran but did not take  Had recurrent symptoms two days later and the headache resolved but now having L sided facial tingling since this morning  Has not called for follow up appointment with neurologist yet, also needs optho referral       Review of Systems    Constitutional: No fever, no chills, feels well, no tiredness, no recent weight gain or weight loss  Cardiovascular: No complaints of slow heart rate, no fast heart rate, no chest pain, no palpitations, no leg claudication, no lower extremity edema  Respiratory: No complaints of shortness of breath, no wheezing, no cough, no SOB on exertion, no orthopnea, no PND  Neurological: as noted in HPI  Active Problems    1  Arthropathy of multiple sites (716 99) (M12 9)   2  Depression (311) (F32 9)   3  Elevated glucose (790 29) (R73 09)   4  Esophagitis (530 10) (K20 9)   5  Generalized abdominal pain (789 07) (R10 84)   6  Headache (784 0) (R51)   7  Hearing loss (389 9) (H91 90)   8  Irritable bowel syndrome (564 1) (K58 9)   9   Malignant "  Mr. Randall J Jennissen is a 64 year old male with a past medical history of CAD, HLD who is seen at [facility] for post coronary intervention follow up. S/p MYNOR x 1 to distal RCA following complain of ongoing chest pain.  Most recent ECHO showed an EF of 55-60%.     Patient reports no recurrent chest pain.  He he describes chest pain intermittently, random, nonexertional for about 2 years that was sharp and sudden onset.  Says that his previous osteoarthritis pain is actually better since procedure.  He does take his Celebrex daily prescribed by rheumatologist.  He has had intermittent sensation of anxiety, tingling in extremities since procedure although this had intermittently been present prior as well.       He denies lightheadedness, shortness of breath, dyspnea on exertion, palpitations, chest pain, and lower extremity edema.     Coronary Angiogram 2/20/2025 reviewed:  CONCLUSIONS:   Single-vessel obstructive coronary disease involving the distal right coronary artery.  Mild to moderate nonobstructive disease elsewhere.  Successful, percutaneous coronary artery intervention of the distal right coronary artery with a 3.5 x 28 mm Synergy XD drug-eluting stent.       ECHO 2/20/2025 Reviewed:   he left ventricle is normal in size. There is normal left ventricular wall  thickness.  Left ventricular systolic function is normal. The visual ejection fraction is  55-60%. No regional wall motion abnormalities noted.     The right ventricle is normal in size and function.  The left atrium is mildly dilated. The right atrium is mildly dilated.  IVC diameter <2.1 cm collapsing >50% with sniff suggests a normal RA pressure  of 3 mmHg.  There is no comparison study available.           Physical Examination Review of Systems   /62 (BP Location: Left arm, Patient Position: Sitting, Cuff Size: Adult Regular)   Pulse 64   Ht 1.753 m (5' 9\")   Wt 90.4 kg (199 lb 6.4 oz)   BMI " melanoma of skin (172 9) (C43 9)   10  Raynaud's syndrome without gangrene (443 0) (I73 00)    Past Medical History    The active problems and past medical history were reviewed and updated today  Surgical History    The surgical history was reviewed and updated today  Family History    1  Family history of Stroke (434 91) (I63 9)    2  Family history of Hypertension (401 9) (I10)    The family history was reviewed and updated today  Social History    · Does not drink alcohol (V49 89) (Z78 9)   · Never smoked  The social history was reviewed and updated today  The social history was reviewed and is unchanged  Current Meds   1  Omeprazole 20 MG Oral Tablet Delayed Release; TAKE 1 TABLET  AS NEEDED    Requested for: [id number]; Last Rx:[id number] Ordered    The medication list was reviewed and updated today  Allergies    1  DEMEROL   2  Sulfa Drugs    Vitals  Vital Signs [Data Includes: Current Encounter]    Recorded: [id number] 01:14PM   Temperature 98 F   Heart Rate 84   Respiration 16   Systolic 205   Diastolic 74   Height 5 ft 4 in   Weight 169 lb    BMI Calculated 29 01   BSA Calculated 1 83     Physical Exam    Constitutional   General appearance: No acute distress, well appearing and well nourished  Ears, Nose, Mouth, and Throat   External inspection of ears and nose: Normal     Otoscopic examination: Tympanic membranes translucent with normal light reflex  Canals patent without erythema  Nasal mucosa, septum, and turbinates: Normal without edema or erythema  Oropharynx: Normal with no erythema, edema, exudate or lesions  Pulmonary   Respiratory effort: No increased work of breathing or signs of respiratory distress  Auscultation of lungs: Clear to auscultation  Cardiovascular   Auscultation of heart: Normal rate and rhythm, normal S1 and S2, without murmurs      Examination of extremities for edema and/or varicosities: Normal     Carotid pulses: Normal     Abdomen 29.45 kg/m    Body mass index is 29.45 kg/m .  Wt Readings from Last 3 Encounters:   03/03/25 90.4 kg (199 lb 6.4 oz)   02/20/25 91.6 kg (202 lb)   02/18/25 91.6 kg (202 lb)     General Appearance:   no distress, normal body habitus   ENT/Mouth: membranes moist, no oral lesions or bleeding gums.      EYES:  no scleral icterus, normal conjunctivae   Neck: no carotid bruits or thyromegaly   Chest/Lungs:   lungs are clear to auscultation, no rales or wheezing, equal chest wall expansion    Cardiovascular:   Regular. Normal first and second heart sounds with no murmurs, rubs, or gallops; the carotid, radial and posterior tibial pulses are intact, absent edema bilaterally        Extremities  Puncture Site: no cyanosis or clubbing  Right radial site is soft without bruising.  Radial pulses and Pedal pulses intact and symmetrical.  CMS intact.   Skin: no xanthelasma, warm.    Neurologic: normal  bilateral, no tremors     Psychiatric: alert and oriented x3, calm                                                        Negative unless noted in HPI     Medical History  Surgical History Family History Social History   Past Medical History:   Diagnosis Date     Calculus of kidney      Headache(784.0)     likely stress     Keratoconus      Uses contact lenses     Past Surgical History:   Procedure Laterality Date     COLONOSCOPY       COLONOSCOPY N/A 12/8/2016    Procedure: COLONOSCOPY;  Surgeon: Sean Lopez MD;  Location: [facility]     COLONOSCOPY N/A 3/25/2024    Procedure: Colonoscopy;  Surgeon: Mca Lyon MD;  Location: [facility]     CV CORONARY ANGIOGRAM N/A 2/20/2025    Procedure: Coronary Angiogram;  Surgeon: Kamari Tolliver MD;  Location: [facility]     CV PCI STENT DRUG ELUTING N/A 2/20/2025    Procedure: Percutaneous Coronary Intervention Stent;  Surgeon: Kamari Tolliver MD;  Location: [facility] CV     ESOPHAGOSCOPY, GASTROSCOPY, DUODENOSCOPY (EGD), COMBINED N/A 6/12/2024    Procedure:  Abdomen: Non-tender, no masses  Liver and spleen: No hepatomegaly or splenomegaly  Lymphatic   Palpation of lymph nodes in neck: No lymphadenopathy  Musculoskeletal   Gait and station: Normal     Neurologic   Cranial nerves: Cranial nerves 2-12 intact  Reflexes: 2+ and symmetric  Sensation: No sensory loss           Signatures   Electronically signed by : FATEMEH Sauer ; Jan 25 2016  1:39PM EST                       (Author) ESOPHAGOGASTRODUODENOSCOPY, WITH BIOPSY;  Surgeon: Candelario Sharp, DO;  Location: [facility]     SURGICAL HISTORY OF -       ORIF left leg     SURGICAL HISTORY OF -       low back nerve block?    Family History   Problem Relation Age of Onset     Cancer - colorectal No family hx of      Prostate Cancer No family hx of      C.A.D. No family hx of     Social History     Socioeconomic History     Marital status:      Spouse name: Not on file     Number of children: Not on file     Years of education: Not on file     Highest education level: Not on file   Occupational History     Not on file   Tobacco Use     Smoking status: Former     Current packs/day: 0.00     Average packs/day: 1 pack/day for 20.0 years (20.0 ttl pk-yrs)     Types: Cigarettes     Start date: 2003     Quit date: 2023     Years since quittin.7     Smokeless tobacco: Never   Vaping Use     Vaping status: Never Used   Substance and Sexual Activity     Alcohol use: Yes     Alcohol/week: 0.0 standard drinks of alcohol     Comment: 12 pack in a week     Drug use: No     Sexual activity: Yes     Partners: Female   Other Topics Concern     Parent/sibling w/ CABG, MI or angioplasty before 65F 55M? No   Social History Narrative     Not on file     Social Drivers of Health     Financial Resource Strain: Low Risk  (2025)    Financial Resource Strain      Within the past 12 months, have you or your family members you live with been unable to get utilities (heat, electricity) when it was really needed?: No   Food Insecurity: Low Risk  (2025)    Food Insecurity      Within the past 12 months, did you worry that your food would run out before you got money to buy more?: No      Within the past 12 months, did the food you bought just not last and you didn t have money to get more?: No   Transportation Needs: Low Risk  (2025)    Transportation Needs      Within the past 12 months, has lack of transportation kept you from medical  appointments, getting your medicines, non-medical meetings or appointments, work, or from getting things that you need?: No   Physical Activity: Unknown (2/11/2025)    Exercise Vital Sign      Days of Exercise per Week: 0 days      Minutes of Exercise per Session: Not on file   Stress: No Stress Concern Present (2/11/2025)    [employer] - Occupational Stress Questionnaire      Feeling of Stress : Only a little   Social Connections: Unknown (2/11/2025)    Social Connection and Isolation Panel [NHANES]      Frequency of Communication with Friends and Family: Not on file      Frequency of Social Gatherings with Friends and Family: Once a week      Attends Church Services: Not on file      Active Member of Clubs or Organizations: Not on file      Attends Club or Organization Meetings: Not on file      Marital Status: Not on file   Interpersonal Safety: Low Risk  (2/20/2025)    Interpersonal Safety      Do you feel physically and emotionally safe where you currently live?: Yes      Within the past 12 months, have you been hit, slapped, kicked or otherwise physically hurt by someone?: No      Within the past 12 months, have you been humiliated or emotionally abused in other ways by your partner or ex-partner?: No   Housing Stability: Low Risk  (2/11/2025)    Housing Stability      Do you have housing? : Yes      Are you worried about losing your housing?: No          Medications  Allergies   Current Outpatient Medications   Medication Sig Dispense Refill     aspirin 81 MG EC tablet Take 1 tablet (81 mg) by mouth daily. 90 tablet 3     atorvastatin (LIPITOR) 40 MG tablet Take 1 tablet (40 mg) by mouth daily. 90 tablet 3     CELEBREX 100 MG capsule Take 100 mg by mouth 2 times daily. Takes 100 mg in  mg pm       clopidogrel (PLAVIX) 75 MG tablet Take 1 tablet (75 mg) by mouth daily. 90 tablet 3     metoprolol succinate ER (TOPROL XL) 25 MG 24 hr tablet Take 1 tablet (25 mg) by mouth  daily. 90 tablet 3    No Known Allergies      Lab Results    Chemistry/lipid CBC Cardiac Enzymes/BNP/TSH/INR   Lab Results   Component Value Date    CHOL 218 (H) 02/20/2025    HDL 64 02/20/2025    TRIG 87 02/20/2025    BUN 16.5 02/18/2025     02/18/2025    CO2 27 02/18/2025    Lab Results   Component Value Date    WBC 4.1 02/18/2025    HGB 14.1 02/18/2025    HCT 40.7 02/18/2025    MCV 91 02/18/2025     02/18/2025    Lab Results   Component Value Date    TSH 2.05 10/09/2024            This note has been dictated using voice recognition software. Any grammatical, typographical, or context distortions are unintentional and inherent to the software    Maria Aburto PA-C      Thank you for allowing me to participate in the care of your patient.      Sincerely,     Maria Rose PA-C     [facility]  cc:   Ron Kaye MD  [address] [place] [address]

## 2025-03-03 NOTE — PROGRESS NOTES
Assessment/Recommendations   Assessment:    1.  Coronary artery disease: DESx1 to distal RCA 2/20/2025. Mild to moderate nonobstructive disease elsewhere. LVEF 55-60%.  - On dual antiplatelet therapy with ASA 81 mg indefinitely and Clopidogrel (Plavix) 75 mg daily for 12 months.  - Patient denies any recurrent chest pain. Prior chest pain was atypical, question single 70% stenosis as true etiology.  - Cardiac rehab has been scheduled  - Reviewed most recent BMP, Hgb, platelet- stable.  - Metoprolol succinate 25 mg once daily    2.  Dyslipidemia: started on high intensity statin therapy with atorvastatin 40 mg. Most recent LDL is 137.     3.  Osteoarthritis: using Celebrex with DAPT      Plan:  - We discussed the importance of antiplatelet therapy and talking with his cardiologist prior to stopping these medications for any reason.    - Encouraged to seek medical attention if recurrent chest pain or shortness of breath.    - Continue metoprolol succinate until 4/11/2025, consider discontinuation    - Continue hyperlipidemia regimen. Fasting lipid profile check scheduled, Order placed. He wants to do this at primary clinic.    - Cardiac rehab as scheduled     - Should reduce or stop Celebrex with DAPT. He will try Tylenol.    - We discussed a diet low in saturated fat, weight loss, and exercise along with medication for better control of cholesterol.  Highly encouraged to participate in nutrition class in cardiac rehab.  - Risk factor modification and lifestyle management topics were discussed including managing comorbidities, weight loss, heart healthy diet, exercise, and smoking cessation.        Follow up with Dr. Kaye 4/11/2025     History of Present Illness/Subjective    Mr. Randall J Jennissen is a 64 year old male with a past medical history of CAD, HLD who is seen at [facility] for post coronary intervention follow up. S/p MYNOR x 1 to distal RCA following complain  "of ongoing chest pain.  Most recent ECHO showed an EF of 55-60%.     Patient reports no recurrent chest pain.  He he describes chest pain intermittently, random, nonexertional for about 2 years that was sharp and sudden onset.  Says that his previous osteoarthritis pain is actually better since procedure.  He does take his Celebrex daily prescribed by rheumatologist.  He has had intermittent sensation of anxiety, tingling in extremities since procedure although this had intermittently been present prior as well.       He denies lightheadedness, shortness of breath, dyspnea on exertion, palpitations, chest pain, and lower extremity edema.     Coronary Angiogram 2/20/2025 reviewed:  CONCLUSIONS:   Single-vessel obstructive coronary disease involving the distal right coronary artery.  Mild to moderate nonobstructive disease elsewhere.  Successful, percutaneous coronary artery intervention of the distal right coronary artery with a 3.5 x 28 mm Synergy XD drug-eluting stent.       ECHO 2/20/2025 Reviewed:   he left ventricle is normal in size. There is normal left ventricular wall  thickness.  Left ventricular systolic function is normal. The visual ejection fraction is  55-60%. No regional wall motion abnormalities noted.     The right ventricle is normal in size and function.  The left atrium is mildly dilated. The right atrium is mildly dilated.  IVC diameter <2.1 cm collapsing >50% with sniff suggests a normal RA pressure  of 3 mmHg.  There is no comparison study available.           Physical Examination Review of Systems   /62 (BP Location: Left arm, Patient Position: Sitting, Cuff Size: Adult Regular)   Pulse 64   Ht 1.753 m (5' 9\")   Wt 90.4 kg (199 lb 6.4 oz)   BMI 29.45 kg/m    Body mass index is 29.45 kg/m .  Wt Readings from Last 3 Encounters:   03/03/25 90.4 kg (199 lb 6.4 oz)   02/20/25 91.6 kg (202 lb)   02/18/25 91.6 kg (202 lb)     General Appearance:   no distress, normal body habitus "   ENT/Mouth: membranes moist, no oral lesions or bleeding gums.      EYES:  no scleral icterus, normal conjunctivae   Neck: no carotid bruits or thyromegaly   Chest/Lungs:   lungs are clear to auscultation, no rales or wheezing, equal chest wall expansion    Cardiovascular:   Regular. Normal first and second heart sounds with no murmurs, rubs, or gallops; the carotid, radial and posterior tibial pulses are intact, absent edema bilaterally        Extremities  Puncture Site: no cyanosis or clubbing  Right radial site is soft without bruising.  Radial pulses and Pedal pulses intact and symmetrical.  CMS intact.   Skin: no xanthelasma, warm.    Neurologic: normal  bilateral, no tremors     Psychiatric: alert and oriented x3, calm                                                        Negative unless noted in HPI     Medical History  Surgical History Family History Social History   Past Medical History:   Diagnosis Date    Calculus of kidney     Headache(784.0)     likely stress    Keratoconus     Uses contact lenses     Past Surgical History:   Procedure Laterality Date    COLONOSCOPY      COLONOSCOPY N/A 12/8/2016    Procedure: COLONOSCOPY;  Surgeon: Sean Lopez MD;  Location: [facility]    COLONOSCOPY N/A 3/25/2024    Procedure: Colonoscopy;  Surgeon: Mac Lyon MD;  Location: [facility]    CV CORONARY ANGIOGRAM N/A 2/20/2025    Procedure: Coronary Angiogram;  Surgeon: Kamari Tolliver MD;  Location: [facility]    CV PCI STENT DRUG ELUTING N/A 2/20/2025    Procedure: Percutaneous Coronary Intervention Stent;  Surgeon: Kamari Tolliver MD;  Location: [facility]    ESOPHAGOSCOPY, GASTROSCOPY, DUODENOSCOPY (EGD), COMBINED N/A 6/12/2024    Procedure: ESOPHAGOGASTRODUODENOSCOPY, WITH BIOPSY;  Surgeon: Candelario Sharp DO;  Location: [facility]    SURGICAL HISTORY OF -       ORIF left leg    SURGICAL HISTORY OF -       low back nerve block?    Family History   Problem Relation Age of Onset     Cancer - colorectal No family hx of     Prostate Cancer No family hx of     C.A.D. No family hx of     Social History     Socioeconomic History    Marital status:      Spouse name: Not on file    Number of children: Not on file    Years of education: Not on file    Highest education level: Not on file   Occupational History    Not on file   Tobacco Use    Smoking status: Former     Current packs/day: 0.00     Average packs/day: 1 pack/day for 20.0 years (20.0 ttl pk-yrs)     Types: Cigarettes     Start date: 2003     Quit date: 2023     Years since quittin.7    Smokeless tobacco: Never   Vaping Use    Vaping status: Never Used   Substance and Sexual Activity    Alcohol use: Yes     Alcohol/week: 0.0 standard drinks of alcohol     Comment: 12 pack in a week    Drug use: No    Sexual activity: Yes     Partners: Female   Other Topics Concern    Parent/sibling w/ CABG, MI or angioplasty before 65F 55M? No   Social History Narrative    Not on file     Social Drivers of Health     Financial Resource Strain: Low Risk  (2025)    Financial Resource Strain     Within the past 12 months, have you or your family members you live with been unable to get utilities (heat, electricity) when it was really needed?: No   Food Insecurity: Low Risk  (2025)    Food Insecurity     Within the past 12 months, did you worry that your food would run out before you got money to buy more?: No     Within the past 12 months, did the food you bought just not last and you didn t have money to get more?: No   Transportation Needs: Low Risk  (2025)    Transportation Needs     Within the past 12 months, has lack of transportation kept you from medical appointments, getting your medicines, non-medical meetings or appointments, work, or from getting things that you need?: No   Physical Activity: Unknown (2025)    Exercise Vital Sign     Days of Exercise per Week: 0 days     Minutes of Exercise per Session: Not on  file   Stress: No Stress Concern Present (2/11/2025)    [employer] - Occupational Stress Questionnaire     Feeling of Stress : Only a little   Social Connections: Unknown (2/11/2025)    Social Connection and Isolation Panel [NHANES]     Frequency of Communication with Friends and Family: Not on file     Frequency of Social Gatherings with Friends and Family: Once a week     Attends Voodoo Services: Not on file     Active Member of Clubs or Organizations: Not on file     Attends Club or Organization Meetings: Not on file     Marital Status: Not on file   Interpersonal Safety: Low Risk  (2/20/2025)    Interpersonal Safety     Do you feel physically and emotionally safe where you currently live?: Yes     Within the past 12 months, have you been hit, slapped, kicked or otherwise physically hurt by someone?: No     Within the past 12 months, have you been humiliated or emotionally abused in other ways by your partner or ex-partner?: No   Housing Stability: Low Risk  (2/11/2025)    Housing Stability     Do you have housing? : Yes     Are you worried about losing your housing?: No          Medications  Allergies   Current Outpatient Medications   Medication Sig Dispense Refill    aspirin 81 MG EC tablet Take 1 tablet (81 mg) by mouth daily. 90 tablet 3    atorvastatin (LIPITOR) 40 MG tablet Take 1 tablet (40 mg) by mouth daily. 90 tablet 3    CELEBREX 100 MG capsule Take 100 mg by mouth 2 times daily. Takes 100 mg in  mg pm      clopidogrel (PLAVIX) 75 MG tablet Take 1 tablet (75 mg) by mouth daily. 90 tablet 3    metoprolol succinate ER (TOPROL XL) 25 MG 24 hr tablet Take 1 tablet (25 mg) by mouth daily. 90 tablet 3    No Known Allergies      Lab Results    Chemistry/lipid CBC Cardiac Enzymes/BNP/TSH/INR   Lab Results   Component Value Date    CHOL 218 (H) 02/20/2025    HDL 64 02/20/2025    TRIG 87 02/20/2025    BUN 16.5 02/18/2025     02/18/2025    CO2 27 02/18/2025    Lab  Results   Component Value Date    WBC 4.1 02/18/2025    HGB 14.1 02/18/2025    HCT 40.7 02/18/2025    MCV 91 02/18/2025     02/18/2025    Lab Results   Component Value Date    TSH 2.05 10/09/2024            This note has been dictated using voice recognition software. Any grammatical, typographical, or context distortions are unintentional and inherent to the software    Maria Aburto PA-C

## 2025-03-03 NOTE — PATIENT INSTRUCTIONS
Randall J Jennissen,    It was a pleasure to see you today at the [facility].     My recommendations after this visit include:    - No medications changes made today.   - Should reduce or stop Celebrex while taking aspirin and Plavix - Try Tylenol for arthritis pain control. Can take 500-1000 mg three times daily.    - Please seek medical attention if you develop recurrent chest pain or shortness of breath or similar symptoms you experienced prior to recent cardiac event    - Please get your lipid level test and liver enzymes (AST/ALT) in 3/20/2024 Make sure to fast for 8-12 hours prior to lab work. Okay to have plain water, black coffee or tea without creamer and sugar  - Change cholesterol lab 3/20/2024 to primary clinic, fasting in the morning    - Cardiac rehab as scheduled     - Follow up with Dr. Kaye 4/11/2024    - Please call [phone], if you have any questions or concerns    Maria Aburto PA-C    Medication     Take all your medications as prescribed  Do not stop any medications without talking with a healthcare provider    Exercise      Physical activity is important for overall health  Set a goal of 150 minutes of exercise each week  For example, 30 minutes of exercise 5 days each week.    These 30 minutes can be broken into shorter periods of 15 minutes twice daily or 10 minutes three times daily  Start any exercise program slowly and work towards the goal of 150 minutes each week  For example, you may start with 10 minutes and plan to add a few minutes each week as you get stronger   Examples of exercise include walking, swimming, or biking  Remember to stretch and stay hydrated with exercise    Diet     A heart healthy diet includes:  A variety of fruits and vegetables  Whole grains  Low-fat dairy (fat-free, 1% fat, and low-fat)  Lean meats and poultry without skin   Fish (eat fish 2 times each week)  Nuts  Limit saturated fat to about 13 grams each day (based on a  2000 calorie diet)  Limit red meat  Limit sugars (sweets and sugary beverages)  Limit your portion sizes  Do not add salt to your food when cooking or at the table  Limit alcohol intake (no more than 1 drink each day for women or 2 drinks each day for men)    Weight Loss     Work on losing weight with diet and exercise  You BMI (body mass index) should be between 18.5-24.9  This is a calculation of your weight and height  Please ask your healthcare provider for your BMI    Manage Other Chronic Health Conditions     Control cholesterol  Eat a diet low in saturated fat  Exercise   Take a statin medication as prescribed  Manage blood pressure  Eat a diet low in sodium  Exercise  Reduce stress  Lose weight   Take blood pressure medications as prescribed  Control blood sugars if diabetic  Monitor sugars and carbohydrates in your diet  Lose weight   Take diabetes medications as prescribed  Follow-up with your primary care provider to make sure your blood sugars are well controlled    Stress Reduction     Find time each day to relax  Reading, listening to music, yoga, meditation, exercise, spending time with friends and family, volunteering   Get 6-8 hours of sleep each night    Smoking Cessation     Smoking causes numerous health problems including coronary artery disease  It is never too late to quit  Set realistic goals for quitting  Decrease the number of cigarettes used each week  Use nicotine gum or patches to help you quit    Information from the American [employer] Association.  Please visit their website at www.heart.org

## 2025-03-05 ENCOUNTER — HOSPITAL ENCOUNTER (OUTPATIENT)
Dept: CARDIAC REHAB | Facility: CLINIC | Age: 65
Discharge: HOME | End: 2025-03-05
Attending: INTERNAL MEDICINE
Payer: COMMERCIAL

## 2025-03-05 PROCEDURE — 93798 PHYS/QHP OP CAR RHAB W/ECG: CPT

## 2025-03-07 ENCOUNTER — HOSPITAL ENCOUNTER (OUTPATIENT)
Dept: CARDIAC REHAB | Facility: CLINIC | Age: 65
Discharge: HOME | End: 2025-03-07
Attending: INTERNAL MEDICINE
Payer: COMMERCIAL

## 2025-03-07 PROCEDURE — 93798 PHYS/QHP OP CAR RHAB W/ECG: CPT

## 2025-03-10 ENCOUNTER — HOSPITAL ENCOUNTER (OUTPATIENT)
Dept: CARDIAC REHAB | Facility: CLINIC | Age: 65
Discharge: HOME | End: 2025-03-10
Attending: INTERNAL MEDICINE
Payer: COMMERCIAL

## 2025-03-10 PROCEDURE — 93798 PHYS/QHP OP CAR RHAB W/ECG: CPT

## 2025-03-12 ENCOUNTER — HOSPITAL ENCOUNTER (OUTPATIENT)
Dept: CARDIAC REHAB | Facility: CLINIC | Age: 65
Discharge: HOME | End: 2025-03-12
Attending: INTERNAL MEDICINE
Payer: COMMERCIAL

## 2025-03-12 PROCEDURE — 93798 PHYS/QHP OP CAR RHAB W/ECG: CPT

## 2025-03-14 ENCOUNTER — HOSPITAL ENCOUNTER (OUTPATIENT)
Dept: CARDIAC REHAB | Facility: CLINIC | Age: 65
Discharge: HOME | End: 2025-03-14
Attending: INTERNAL MEDICINE
Payer: COMMERCIAL

## 2025-03-14 PROCEDURE — 93798 PHYS/QHP OP CAR RHAB W/ECG: CPT

## 2025-03-17 ENCOUNTER — HOSPITAL ENCOUNTER (OUTPATIENT)
Dept: CARDIAC REHAB | Facility: CLINIC | Age: 65
Discharge: HOME | End: 2025-03-17
Attending: INTERNAL MEDICINE
Payer: COMMERCIAL

## 2025-03-17 PROCEDURE — 93798 PHYS/QHP OP CAR RHAB W/ECG: CPT

## 2025-03-19 ENCOUNTER — HOSPITAL ENCOUNTER (OUTPATIENT)
Dept: CARDIAC REHAB | Facility: CLINIC | Age: 65
Discharge: HOME | End: 2025-03-19
Attending: INTERNAL MEDICINE
Payer: COMMERCIAL

## 2025-03-19 PROCEDURE — 93798 PHYS/QHP OP CAR RHAB W/ECG: CPT

## 2025-03-21 ENCOUNTER — HOSPITAL ENCOUNTER (OUTPATIENT)
Dept: CARDIAC REHAB | Facility: CLINIC | Age: 65
Discharge: HOME | End: 2025-03-21
Attending: INTERNAL MEDICINE
Payer: COMMERCIAL

## 2025-03-21 PROCEDURE — 93798 PHYS/QHP OP CAR RHAB W/ECG: CPT | Performed by: REHABILITATION PRACTITIONER

## 2025-03-24 ENCOUNTER — HOSPITAL ENCOUNTER (OUTPATIENT)
Dept: CARDIAC REHAB | Facility: CLINIC | Age: 65
Discharge: HOME | End: 2025-03-24
Attending: INTERNAL MEDICINE
Payer: COMMERCIAL

## 2025-03-24 PROCEDURE — 93798 PHYS/QHP OP CAR RHAB W/ECG: CPT

## 2025-03-26 ENCOUNTER — HOSPITAL ENCOUNTER (OUTPATIENT)
Dept: CARDIAC REHAB | Facility: CLINIC | Age: 65
Discharge: HOME | End: 2025-03-26
Attending: INTERNAL MEDICINE
Payer: COMMERCIAL

## 2025-03-26 PROCEDURE — 93798 PHYS/QHP OP CAR RHAB W/ECG: CPT

## 2025-03-28 ENCOUNTER — HOSPITAL ENCOUNTER (OUTPATIENT)
Dept: CARDIAC REHAB | Facility: CLINIC | Age: 65
Discharge: HOME | End: 2025-03-28
Attending: INTERNAL MEDICINE
Payer: COMMERCIAL

## 2025-03-28 PROCEDURE — 93798 PHYS/QHP OP CAR RHAB W/ECG: CPT

## 2025-03-31 ENCOUNTER — HOSPITAL ENCOUNTER (OUTPATIENT)
Dept: CARDIAC REHAB | Facility: CLINIC | Age: 65
Discharge: HOME | End: 2025-03-31
Attending: INTERNAL MEDICINE
Payer: COMMERCIAL

## 2025-03-31 PROCEDURE — 93798 PHYS/QHP OP CAR RHAB W/ECG: CPT

## 2025-04-02 ENCOUNTER — HOSPITAL ENCOUNTER (OUTPATIENT)
Dept: CT IMAGING | Facility: HOSPITAL | Age: 65
Discharge: HOME | End: 2025-04-02
Attending: PHYSICIAN ASSISTANT
Payer: COMMERCIAL

## 2025-04-02 DIAGNOSIS — K65.4 MESENTERIC PANNICULITIS (H): ICD-10-CM

## 2025-04-02 PROCEDURE — 250N000011 HC RX IP 250 OP 636: Performed by: PHYSICIAN ASSISTANT

## 2025-04-02 PROCEDURE — 74177 CT ABD & PELVIS W/CONTRAST: CPT

## 2025-04-02 PROCEDURE — 250N000009 HC RX 250: Performed by: PHYSICIAN ASSISTANT

## 2025-04-02 RX ORDER — IOPAMIDOL 755 MG/ML
90 INJECTION, SOLUTION INTRAVASCULAR ONCE
Status: COMPLETED | OUTPATIENT
Start: 2025-04-02 | End: 2025-04-02

## 2025-04-02 RX ADMIN — IOPAMIDOL 90 ML: 755 INJECTION, SOLUTION INTRAVENOUS at 07:59

## 2025-04-02 RX ADMIN — SODIUM CHLORIDE 72 ML: 9 INJECTION, SOLUTION INTRAVENOUS at 08:00

## 2025-04-04 ENCOUNTER — HOSPITAL ENCOUNTER (OUTPATIENT)
Dept: CARDIAC REHAB | Facility: CLINIC | Age: 65
Discharge: HOME | End: 2025-04-04
Attending: INTERNAL MEDICINE
Payer: COMMERCIAL

## 2025-04-04 PROCEDURE — 93798 PHYS/QHP OP CAR RHAB W/ECG: CPT | Performed by: REHABILITATION PRACTITIONER

## 2025-04-07 ENCOUNTER — HOSPITAL ENCOUNTER (OUTPATIENT)
Dept: CARDIAC REHAB | Facility: CLINIC | Age: 65
Discharge: HOME | End: 2025-04-07
Attending: INTERNAL MEDICINE
Payer: COMMERCIAL

## 2025-04-07 PROCEDURE — 93798 PHYS/QHP OP CAR RHAB W/ECG: CPT

## 2025-04-14 ENCOUNTER — HOSPITAL ENCOUNTER (OUTPATIENT)
Dept: CARDIAC REHAB | Facility: CLINIC | Age: 65
Discharge: HOME | End: 2025-04-14
Attending: INTERNAL MEDICINE
Payer: COMMERCIAL

## 2025-04-14 PROCEDURE — 93798 PHYS/QHP OP CAR RHAB W/ECG: CPT

## 2025-04-18 ENCOUNTER — HOSPITAL ENCOUNTER (OUTPATIENT)
Dept: CARDIAC REHAB | Facility: CLINIC | Age: 65
Discharge: HOME | End: 2025-04-18
Attending: INTERNAL MEDICINE
Payer: COMMERCIAL

## 2025-04-18 PROCEDURE — 93798 PHYS/QHP OP CAR RHAB W/ECG: CPT

## 2025-04-21 ENCOUNTER — HOSPITAL ENCOUNTER (OUTPATIENT)
Dept: CARDIAC REHAB | Facility: CLINIC | Age: 65
Discharge: HOME | End: 2025-04-21
Attending: INTERNAL MEDICINE
Payer: COMMERCIAL

## 2025-04-21 PROCEDURE — 93798 PHYS/QHP OP CAR RHAB W/ECG: CPT

## 2025-04-28 ENCOUNTER — HOSPITAL ENCOUNTER (OUTPATIENT)
Dept: CARDIAC REHAB | Facility: CLINIC | Age: 65
Discharge: HOME | End: 2025-04-28
Attending: INTERNAL MEDICINE
Payer: COMMERCIAL

## 2025-04-28 PROCEDURE — 93798 PHYS/QHP OP CAR RHAB W/ECG: CPT

## 2025-05-02 ENCOUNTER — HOSPITAL ENCOUNTER (OUTPATIENT)
Dept: CARDIAC REHAB | Facility: CLINIC | Age: 65
Discharge: HOME | End: 2025-05-02
Attending: INTERNAL MEDICINE
Payer: COMMERCIAL

## 2025-05-02 PROCEDURE — 93798 PHYS/QHP OP CAR RHAB W/ECG: CPT

## 2025-05-05 ENCOUNTER — HOSPITAL ENCOUNTER (OUTPATIENT)
Dept: CARDIAC REHAB | Facility: CLINIC | Age: 65
Discharge: HOME | End: 2025-05-05
Attending: INTERNAL MEDICINE
Payer: COMMERCIAL

## 2025-05-05 PROCEDURE — 93798 PHYS/QHP OP CAR RHAB W/ECG: CPT

## 2025-05-09 ENCOUNTER — HOSPITAL ENCOUNTER (OUTPATIENT)
Dept: CARDIAC REHAB | Facility: CLINIC | Age: 65
Discharge: HOME | End: 2025-05-09
Attending: INTERNAL MEDICINE
Payer: COMMERCIAL

## 2025-05-09 PROCEDURE — 93798 PHYS/QHP OP CAR RHAB W/ECG: CPT

## 2025-05-12 ENCOUNTER — HOSPITAL ENCOUNTER (OUTPATIENT)
Dept: CARDIAC REHAB | Facility: CLINIC | Age: 65
Discharge: HOME | End: 2025-05-12
Attending: INTERNAL MEDICINE
Payer: COMMERCIAL

## 2025-05-12 PROCEDURE — 93798 PHYS/QHP OP CAR RHAB W/ECG: CPT

## 2025-05-19 ENCOUNTER — HOSPITAL ENCOUNTER (OUTPATIENT)
Dept: CARDIAC REHAB | Facility: CLINIC | Age: 65
Discharge: HOME | End: 2025-05-19
Attending: INTERNAL MEDICINE
Payer: COMMERCIAL

## 2025-05-19 PROCEDURE — 93798 PHYS/QHP OP CAR RHAB W/ECG: CPT

## 2025-05-23 ENCOUNTER — OFFICE VISIT (OUTPATIENT)
Dept: FAMILY MEDICINE | Facility: CLINIC | Age: 65
End: 2025-05-23
Payer: COMMERCIAL

## 2025-05-23 VITALS
OXYGEN SATURATION: 98 % | HEIGHT: 69 IN | BODY MASS INDEX: 29.47 KG/M2 | WEIGHT: 199 LBS | RESPIRATION RATE: 16 BRPM | HEART RATE: 61 BPM | SYSTOLIC BLOOD PRESSURE: 127 MMHG | DIASTOLIC BLOOD PRESSURE: 78 MMHG | TEMPERATURE: 98.3 F

## 2025-05-23 DIAGNOSIS — M25.542 JOINT PAIN IN BOTH HANDS: ICD-10-CM

## 2025-05-23 DIAGNOSIS — M25.541 JOINT PAIN IN BOTH HANDS: ICD-10-CM

## 2025-05-23 DIAGNOSIS — R53.83 OTHER FATIGUE: Primary | ICD-10-CM

## 2025-05-23 LAB
B BURGDOR IGG+IGM SER QL: 0.17
ERYTHROCYTE [SEDIMENTATION RATE] IN BLOOD BY WESTERGREN METHOD: 10 MM/HR (ref 0–20)

## 2025-05-23 PROCEDURE — 3074F SYST BP LT 130 MM HG: CPT | Performed by: FAMILY MEDICINE

## 2025-05-23 PROCEDURE — 3078F DIAST BP <80 MM HG: CPT | Performed by: FAMILY MEDICINE

## 2025-05-23 PROCEDURE — 85652 RBC SED RATE AUTOMATED: CPT | Performed by: FAMILY MEDICINE

## 2025-05-23 PROCEDURE — 99214 OFFICE O/P EST MOD 30 MIN: CPT | Performed by: FAMILY MEDICINE

## 2025-05-23 PROCEDURE — 1125F AMNT PAIN NOTED PAIN PRSNT: CPT | Performed by: FAMILY MEDICINE

## 2025-05-23 PROCEDURE — 36415 COLL VENOUS BLD VENIPUNCTURE: CPT | Performed by: FAMILY MEDICINE

## 2025-05-23 PROCEDURE — 86618 LYME DISEASE ANTIBODY: CPT | Performed by: FAMILY MEDICINE

## 2025-05-23 ASSESSMENT — PAIN SCALES - GENERAL: PAINLEVEL_OUTOF10: MODERATE PAIN (4)

## 2025-05-23 NOTE — PROGRESS NOTES
"  Assessment & Plan     Other fatigue  Patient with chronic fatigue, currently worsening  Extensive work up in the past    - Adult Sleep Eval & Management Referral; Future    Joint pain in both hands  ***  - LYME DISEASE TOTAL ANTIBODIES WITH REFLEX TO CONFIRMATION; Future  - ESR: Erythrocyte sedimentation rate; Future  - Orthopedic  Referral; Future  - ESR: Erythrocyte sedimentation rate  - LYME DISEASE TOTAL ANTIBODIES WITH REFLEX TO CONFIRMATION          BMI  Estimated body mass index is 29.37 kg/m  as calculated from the following:    Height as of this encounter: 1.753 m (5' 9.02\").    Weight as of this encounter: 90.3 kg (199 lb).   {Weight Management Plan needed for ACO:792865}      {Follow-up (Optional):350176}    Dorys Doe is a 64 year old, presenting for the following health issues:  No chief complaint on file.        5/23/2025     8:00 AM   Additional Questions   Roomed by Melina HERNÁNDEZ CMA     History of Present Illness       Back Pain:  He presents for follow up of back pain. Patient's back pain is a recurring problem.  Location of back pain:  Right lower back  Description of back pain: dull ache  Back pain spreads: nowhere    Since patient first noticed back pain, pain is: gradually improving  Does back pain interfere with his job:  Yes       Reason for visit:  Fatiqueand joint pain  Symptom onset:  1-2 weeks ago  Symptoms include:  Fatigue joint pain  Symptom intensity:  Moderate  Symptom progression:  Staying the same  Had these symptoms before:  Yes  Has tried/received treatment for these symptoms:  No  What makes it worse:  No  What makes it better:  Sleep   He is taking medications regularly.        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  There is no height or weight on file to calculate BMI.  Physical Exam   {Exam List (Optional):831594}    {Diagnostic Test Results (Optional):272760}        Signed Electronically by: Lizzie Austin MD  {Email feedback regarding this note to [email or web address]   :[id number]}

## 2025-05-23 NOTE — NURSING NOTE
"Chief Complaint   Patient presents with    Pain     Back, joint     Fatigue       Initial /78 (BP Location: Right arm, Patient Position: Sitting, Cuff Size: Adult Large)   Pulse 61   Temp 98.3  F (36.8  C)   Resp 16   Ht 1.753 m (5' 9.02\")   Wt 90.3 kg (199 lb)   SpO2 98%   BMI 29.37 kg/m   Estimated body mass index is 29.37 kg/m  as calculated from the following:    Height as of this encounter: 1.753 m (5' 9.02\").    Weight as of this encounter: 90.3 kg (199 lb).    Patient presents to the clinic using No DME    Is there anyone who you would like to be able to receive your results? No  If yes have patient fill out ANNE      "

## 2025-05-26 ENCOUNTER — PATIENT OUTREACH (OUTPATIENT)
Dept: CARE COORDINATION | Facility: CLINIC | Age: 65
End: 2025-05-26
Payer: COMMERCIAL

## 2025-05-28 ENCOUNTER — PATIENT OUTREACH (OUTPATIENT)
Dept: CARE COORDINATION | Facility: CLINIC | Age: 65
End: 2025-05-28
Payer: COMMERCIAL

## 2025-06-02 ENCOUNTER — HOSPITAL ENCOUNTER (OUTPATIENT)
Dept: CARDIAC REHAB | Facility: CLINIC | Age: 65
Discharge: HOME | End: 2025-06-02
Attending: INTERNAL MEDICINE
Payer: COMMERCIAL

## 2025-06-02 PROCEDURE — 93798 PHYS/QHP OP CAR RHAB W/ECG: CPT

## 2025-06-09 ENCOUNTER — HOSPITAL ENCOUNTER (OUTPATIENT)
Dept: CARDIAC REHAB | Facility: CLINIC | Age: 65
Discharge: HOME | End: 2025-06-09
Attending: INTERNAL MEDICINE
Payer: COMMERCIAL

## 2025-06-09 PROCEDURE — 93798 PHYS/QHP OP CAR RHAB W/ECG: CPT

## 2025-06-16 ENCOUNTER — HOSPITAL ENCOUNTER (OUTPATIENT)
Dept: CARDIAC REHAB | Facility: CLINIC | Age: 65
Discharge: HOME | End: 2025-06-16
Attending: INTERNAL MEDICINE
Payer: COMMERCIAL

## 2025-06-16 PROCEDURE — 93798 PHYS/QHP OP CAR RHAB W/ECG: CPT | Performed by: REHABILITATION PRACTITIONER

## 2025-06-23 ENCOUNTER — HOSPITAL ENCOUNTER (OUTPATIENT)
Dept: CARDIAC REHAB | Facility: CLINIC | Age: 65
Discharge: HOME | End: 2025-06-23
Attending: INTERNAL MEDICINE
Payer: COMMERCIAL

## 2025-06-23 PROCEDURE — 93798 PHYS/QHP OP CAR RHAB W/ECG: CPT

## 2025-06-30 ENCOUNTER — HOSPITAL ENCOUNTER (OUTPATIENT)
Dept: CARDIAC REHAB | Facility: CLINIC | Age: 65
Discharge: HOME | End: 2025-06-30
Attending: INTERNAL MEDICINE
Payer: COMMERCIAL

## 2025-06-30 PROCEDURE — 93798 PHYS/QHP OP CAR RHAB W/ECG: CPT

## 2025-07-07 ENCOUNTER — HOSPITAL ENCOUNTER (OUTPATIENT)
Dept: CARDIAC REHAB | Facility: CLINIC | Age: 65
Discharge: HOME | End: 2025-07-07
Attending: INTERNAL MEDICINE
Payer: COMMERCIAL

## 2025-07-07 PROCEDURE — 93798 PHYS/QHP OP CAR RHAB W/ECG: CPT

## 2025-07-14 ENCOUNTER — HOSPITAL ENCOUNTER (OUTPATIENT)
Dept: CARDIAC REHAB | Facility: CLINIC | Age: 65
Discharge: HOME | End: 2025-07-14
Attending: INTERNAL MEDICINE
Payer: COMMERCIAL

## 2025-07-14 PROCEDURE — 93798 PHYS/QHP OP CAR RHAB W/ECG: CPT

## 2025-07-21 ENCOUNTER — HOSPITAL ENCOUNTER (OUTPATIENT)
Dept: CARDIAC REHAB | Facility: CLINIC | Age: 65
Discharge: HOME | End: 2025-07-21
Attending: INTERNAL MEDICINE
Payer: COMMERCIAL

## 2025-07-21 PROCEDURE — 93798 PHYS/QHP OP CAR RHAB W/ECG: CPT

## 2025-07-30 ENCOUNTER — HOSPITAL ENCOUNTER (OUTPATIENT)
Dept: CARDIAC REHAB | Facility: CLINIC | Age: 65
Discharge: HOME | End: 2025-07-30
Attending: INTERNAL MEDICINE
Payer: COMMERCIAL

## 2025-07-30 PROCEDURE — 93798 PHYS/QHP OP CAR RHAB W/ECG: CPT

## 2025-07-30 PROCEDURE — 93797 PHYS/QHP OP CAR RHAB WO ECG: CPT

## (undated) DEVICE — CATH ANGIO JUDKINS R4 6FRX100CM INFINITI 534621T

## (undated) DEVICE — MANIFOLD KIT ANGIO AUTOMATED 014613

## (undated) DEVICE — SHTH INTRO 0.021IN ID 6FR DIA

## (undated) DEVICE — SYR ANGIOGRAPHY MULTIUSE KIT ACIST 014612

## (undated) DEVICE — VALVE HEMOSTASIS .096" COPILOT MECH 1003331

## (undated) DEVICE — KIT HAND CONTROL ACIST 014644 AR-P54

## (undated) DEVICE — ENDO FORCEP ENDOJAW BIOPSY 2.8MMX230CM FB-220U

## (undated) DEVICE — CATH GUIDING 5FR JR 4 LA5JR40

## (undated) DEVICE — GUIDEWIRE JTIP 3MM .035 180CM IQ35F180J3

## (undated) DEVICE — CATH BALLOON NC EMERGE 3.50X20MM H7493926720350

## (undated) DEVICE — DEVICE INFLATION SYR W/ HEMOSTASIS VALVE 12IN EXT IN4904

## (undated) DEVICE — CATH LAUNCHER 6FR JR 4.0 LA6JR40

## (undated) DEVICE — CATH BALLOON NC EMERGE 4.50X12MM H7493926712450

## (undated) DEVICE — CATH ANGIO JUDKINS JL3.5 6FRX100CM INFINITI 534618T

## (undated) DEVICE — SLEEVE TR BAND RADIAL COMPRESSION DEVICE 24CM TRB24-REG

## (undated) DEVICE — GUIDEWIRE VASC 0.014INX180CM RUNTHROUGH 25-1011

## (undated) RX ORDER — PROPOFOL 10 MG/ML
INJECTION, EMULSION INTRAVENOUS
Status: DISPENSED
Start: 2024-06-12

## (undated) RX ORDER — LIDOCAINE HYDROCHLORIDE 10 MG/ML
INJECTION, SOLUTION EPIDURAL; INFILTRATION; INTRACAUDAL; PERINEURAL
Status: DISPENSED
Start: 2024-06-12

## (undated) RX ORDER — ACETAMINOPHEN 325 MG/1
TABLET ORAL
Status: DISPENSED
Start: 2025-02-20

## (undated) RX ORDER — DIAZEPAM 5 MG/1
TABLET ORAL
Status: DISPENSED
Start: 2025-02-20

## (undated) RX ORDER — PROPOFOL 10 MG/ML
INJECTION, EMULSION INTRAVENOUS
Status: DISPENSED
Start: 2024-03-25